# Patient Record
Sex: FEMALE | Race: WHITE | URBAN - METROPOLITAN AREA
[De-identification: names, ages, dates, MRNs, and addresses within clinical notes are randomized per-mention and may not be internally consistent; named-entity substitution may affect disease eponyms.]

---

## 2017-01-31 DIAGNOSIS — F32.89 DEPRESSIVE DISORDER, NOT ELSEWHERE CLASSIFIED: Primary | ICD-10-CM

## 2017-01-31 RX ORDER — CITALOPRAM 40 MG/1
40 TABLET ORAL DAILY
Qty: 90 TABLET | Refills: 1 | Status: SHIPPED | OUTPATIENT
Start: 2017-01-31 | End: 2017-07-28 | Stop reason: SDUPTHER

## 2017-02-02 ENCOUNTER — OFFICE VISIT (OUTPATIENT)
Dept: FAMILY MEDICINE CLINIC | Age: 62
End: 2017-02-02

## 2017-02-02 VITALS
HEART RATE: 85 BPM | WEIGHT: 160.4 LBS | BODY MASS INDEX: 25.78 KG/M2 | SYSTOLIC BLOOD PRESSURE: 110 MMHG | DIASTOLIC BLOOD PRESSURE: 62 MMHG | HEIGHT: 66 IN | OXYGEN SATURATION: 95 % | TEMPERATURE: 98.2 F

## 2017-02-02 DIAGNOSIS — R07.2 PRECORDIAL PAIN: Primary | ICD-10-CM

## 2017-02-02 DIAGNOSIS — K21.9 GASTROESOPHAGEAL REFLUX DISEASE, ESOPHAGITIS PRESENCE NOT SPECIFIED: ICD-10-CM

## 2017-02-02 PROCEDURE — 99213 OFFICE O/P EST LOW 20 MIN: CPT | Performed by: FAMILY MEDICINE

## 2017-02-02 PROCEDURE — 93000 ELECTROCARDIOGRAM COMPLETE: CPT | Performed by: FAMILY MEDICINE

## 2017-02-02 RX ORDER — OMEPRAZOLE 40 MG/1
40 CAPSULE, DELAYED RELEASE ORAL DAILY
Qty: 30 CAPSULE | Refills: 3 | Status: SHIPPED | OUTPATIENT
Start: 2017-02-02 | End: 2017-04-21

## 2017-02-02 ASSESSMENT — ENCOUNTER SYMPTOMS
RESPIRATORY NEGATIVE: 1
EYES NEGATIVE: 1
GASTROINTESTINAL NEGATIVE: 1

## 2017-03-10 ENCOUNTER — OFFICE VISIT (OUTPATIENT)
Dept: ORTHOPEDIC SURGERY | Age: 62
End: 2017-03-10

## 2017-03-10 VITALS
BODY MASS INDEX: 25.79 KG/M2 | HEART RATE: 76 BPM | SYSTOLIC BLOOD PRESSURE: 106 MMHG | WEIGHT: 160.5 LBS | HEIGHT: 66 IN | DIASTOLIC BLOOD PRESSURE: 61 MMHG

## 2017-03-10 DIAGNOSIS — S83.241A TEAR OF MEDIAL MENISCUS OF RIGHT KNEE, CURRENT, UNSPECIFIED TEAR TYPE, INITIAL ENCOUNTER: ICD-10-CM

## 2017-03-10 DIAGNOSIS — M89.9 BONE LESION: ICD-10-CM

## 2017-03-10 DIAGNOSIS — M25.562 PAIN IN BOTH KNEES, UNSPECIFIED CHRONICITY: Primary | ICD-10-CM

## 2017-03-10 DIAGNOSIS — M84.361A STRESS FRACTURE OF RIGHT TIBIA, INITIAL ENCOUNTER: ICD-10-CM

## 2017-03-10 DIAGNOSIS — M25.561 PAIN IN BOTH KNEES, UNSPECIFIED CHRONICITY: Primary | ICD-10-CM

## 2017-03-10 PROCEDURE — 99213 OFFICE O/P EST LOW 20 MIN: CPT | Performed by: PHYSICIAN ASSISTANT

## 2017-03-10 PROCEDURE — 73564 X-RAY EXAM KNEE 4 OR MORE: CPT | Performed by: PHYSICIAN ASSISTANT

## 2017-03-23 ENCOUNTER — OFFICE VISIT (OUTPATIENT)
Dept: ORTHOPEDIC SURGERY | Age: 62
End: 2017-03-23

## 2017-03-23 DIAGNOSIS — M23.306 DEGENERATIVE TEAR OF MENISCUS, RIGHT: ICD-10-CM

## 2017-03-23 DIAGNOSIS — M17.0 PRIMARY OSTEOARTHRITIS OF BOTH KNEES: Primary | ICD-10-CM

## 2017-03-23 PROBLEM — M23.309 DEGENERATIVE TEAR OF MENISCUS: Status: ACTIVE | Noted: 2017-03-23

## 2017-03-23 PROCEDURE — 20611 DRAIN/INJ JOINT/BURSA W/US: CPT | Performed by: PHYSICIAN ASSISTANT

## 2017-03-23 PROCEDURE — 99213 OFFICE O/P EST LOW 20 MIN: CPT | Performed by: PHYSICIAN ASSISTANT

## 2017-04-07 ENCOUNTER — OFFICE VISIT (OUTPATIENT)
Dept: ORTHOPEDIC SURGERY | Age: 62
End: 2017-04-07

## 2017-04-07 VITALS
WEIGHT: 160.5 LBS | DIASTOLIC BLOOD PRESSURE: 44 MMHG | SYSTOLIC BLOOD PRESSURE: 114 MMHG | BODY MASS INDEX: 25.79 KG/M2 | HEART RATE: 83 BPM | HEIGHT: 66 IN

## 2017-04-07 DIAGNOSIS — M17.11 PRIMARY OSTEOARTHRITIS OF RIGHT KNEE: ICD-10-CM

## 2017-04-07 DIAGNOSIS — S83.241D OTHER TEAR OF MEDIAL MENISCUS OF RIGHT KNEE AS CURRENT INJURY, SUBSEQUENT ENCOUNTER: Primary | ICD-10-CM

## 2017-04-07 PROCEDURE — 99213 OFFICE O/P EST LOW 20 MIN: CPT | Performed by: PHYSICIAN ASSISTANT

## 2017-04-07 RX ORDER — TRAMADOL HYDROCHLORIDE 50 MG/1
TABLET ORAL
Qty: 30 TABLET | Refills: 0 | Status: SHIPPED | OUTPATIENT
Start: 2017-04-07

## 2017-04-18 ENCOUNTER — TELEPHONE (OUTPATIENT)
Dept: ORTHOPEDIC SURGERY | Age: 62
End: 2017-04-18

## 2017-04-21 ENCOUNTER — TELEPHONE (OUTPATIENT)
Dept: FAMILY MEDICINE CLINIC | Age: 62
End: 2017-04-21

## 2017-04-27 ENCOUNTER — OFFICE VISIT (OUTPATIENT)
Dept: FAMILY MEDICINE CLINIC | Age: 62
End: 2017-04-27

## 2017-04-27 ENCOUNTER — OFFICE VISIT (OUTPATIENT)
Dept: ORTHOPEDIC SURGERY | Age: 62
End: 2017-04-27

## 2017-04-27 VITALS
SYSTOLIC BLOOD PRESSURE: 112 MMHG | HEIGHT: 66 IN | BODY MASS INDEX: 24.91 KG/M2 | WEIGHT: 154.98 LBS | HEART RATE: 81 BPM | DIASTOLIC BLOOD PRESSURE: 57 MMHG

## 2017-04-27 VITALS
WEIGHT: 153.4 LBS | BODY MASS INDEX: 24.65 KG/M2 | SYSTOLIC BLOOD PRESSURE: 110 MMHG | HEIGHT: 66 IN | HEART RATE: 98 BPM | OXYGEN SATURATION: 100 % | DIASTOLIC BLOOD PRESSURE: 70 MMHG | TEMPERATURE: 98 F

## 2017-04-27 DIAGNOSIS — S83.241A OTHER TEAR OF MEDIAL MENISCUS OF RIGHT KNEE AS CURRENT INJURY, INITIAL ENCOUNTER: ICD-10-CM

## 2017-04-27 DIAGNOSIS — M17.0 PRIMARY OSTEOARTHRITIS OF BOTH KNEES: ICD-10-CM

## 2017-04-27 DIAGNOSIS — F41.1 ANXIETY STATE: ICD-10-CM

## 2017-04-27 DIAGNOSIS — F32.0 MILD SINGLE CURRENT EPISODE OF MAJOR DEPRESSIVE DISORDER (HCC): ICD-10-CM

## 2017-04-27 DIAGNOSIS — Z01.818 PRE-OPERATIVE GENERAL PHYSICAL EXAMINATION: ICD-10-CM

## 2017-04-27 DIAGNOSIS — M23.306 DEGENERATIVE TEAR OF MENISCUS, RIGHT: ICD-10-CM

## 2017-04-27 DIAGNOSIS — M17.11 PRIMARY OSTEOARTHRITIS OF RIGHT KNEE: ICD-10-CM

## 2017-04-27 DIAGNOSIS — S83.241D OTHER TEAR OF MEDIAL MENISCUS OF RIGHT KNEE AS CURRENT INJURY, SUBSEQUENT ENCOUNTER: Primary | ICD-10-CM

## 2017-04-27 DIAGNOSIS — E03.9 ACQUIRED HYPOTHYROIDISM: ICD-10-CM

## 2017-04-27 DIAGNOSIS — Z01.818 PRE-OPERATIVE GENERAL PHYSICAL EXAMINATION: Primary | ICD-10-CM

## 2017-04-27 DIAGNOSIS — K21.9 GASTROESOPHAGEAL REFLUX DISEASE, ESOPHAGITIS PRESENCE NOT SPECIFIED: ICD-10-CM

## 2017-04-27 PROBLEM — M25.561 PAIN IN BOTH KNEES: Status: RESOLVED | Noted: 2017-03-10 | Resolved: 2017-04-27

## 2017-04-27 PROBLEM — M25.562 PAIN IN BOTH KNEES: Status: RESOLVED | Noted: 2017-03-10 | Resolved: 2017-04-27

## 2017-04-27 LAB
ALBUMIN SERPL-MCNC: 4.4 G/DL (ref 3.4–5)
ANION GAP SERPL CALCULATED.3IONS-SCNC: 13 MMOL/L (ref 3–16)
APTT: 26.5 SEC (ref 21–31.8)
BUN BLDV-MCNC: 15 MG/DL (ref 7–20)
CALCIUM SERPL-MCNC: 9.5 MG/DL (ref 8.3–10.6)
CHLORIDE BLD-SCNC: 99 MMOL/L (ref 99–110)
CO2: 29 MMOL/L (ref 21–32)
CREAT SERPL-MCNC: <0.5 MG/DL (ref 0.6–1.2)
GFR AFRICAN AMERICAN: >60
GFR NON-AFRICAN AMERICAN: >60
GLUCOSE BLD-MCNC: 86 MG/DL (ref 70–99)
INR BLD: 1.04 (ref 0.85–1.15)
PHOSPHORUS: 4.4 MG/DL (ref 2.5–4.9)
POTASSIUM SERPL-SCNC: 3.7 MMOL/L (ref 3.5–5.1)
PROTHROMBIN TIME: 11.7 SEC (ref 9.6–13)
SODIUM BLD-SCNC: 141 MMOL/L (ref 136–145)

## 2017-04-27 PROCEDURE — PREOPEXAM PRE-OP EXAM: Performed by: ORTHOPAEDIC SURGERY

## 2017-04-27 PROCEDURE — 99244 OFF/OP CNSLTJ NEW/EST MOD 40: CPT | Performed by: FAMILY MEDICINE

## 2017-04-28 ENCOUNTER — HOSPITAL ENCOUNTER (OUTPATIENT)
Dept: SURGERY | Age: 62
Discharge: HOME | End: 2017-04-28
Attending: ORTHOPAEDIC SURGERY | Admitting: ORTHOPAEDIC SURGERY

## 2017-04-28 VITALS
WEIGHT: 153 LBS | HEIGHT: 66 IN | TEMPERATURE: 97 F | OXYGEN SATURATION: 100 % | RESPIRATION RATE: 16 BRPM | BODY MASS INDEX: 24.59 KG/M2 | SYSTOLIC BLOOD PRESSURE: 128 MMHG | DIASTOLIC BLOOD PRESSURE: 66 MMHG | HEART RATE: 85 BPM

## 2017-04-28 DIAGNOSIS — E03.9 ACQUIRED HYPOTHYROIDISM: ICD-10-CM

## 2017-04-28 RX ORDER — SCOLOPAMINE TRANSDERMAL SYSTEM 1 MG/1
1 PATCH, EXTENDED RELEASE TRANSDERMAL ONCE
Status: DISCONTINUED | OUTPATIENT
Start: 2017-04-28 | End: 2017-04-29 | Stop reason: HOSPADM

## 2017-04-28 RX ORDER — APREPITANT 40 MG/1
CAPSULE ORAL
Status: DISPENSED
Start: 2017-04-28 | End: 2017-04-28

## 2017-04-28 RX ORDER — LIDOCAINE HYDROCHLORIDE 10 MG/ML
1 INJECTION, SOLUTION EPIDURAL; INFILTRATION; INTRACAUDAL; PERINEURAL
Status: ACTIVE | OUTPATIENT
Start: 2017-04-28 | End: 2017-04-28

## 2017-04-28 RX ORDER — DIPHENHYDRAMINE HYDROCHLORIDE 50 MG/ML
12.5 INJECTION INTRAMUSCULAR; INTRAVENOUS
Status: ACTIVE | OUTPATIENT
Start: 2017-04-28 | End: 2017-04-28

## 2017-04-28 RX ORDER — MEPERIDINE HYDROCHLORIDE 50 MG/ML
12.5 INJECTION INTRAMUSCULAR; INTRAVENOUS; SUBCUTANEOUS EVERY 5 MIN PRN
Status: DISCONTINUED | OUTPATIENT
Start: 2017-04-28 | End: 2017-04-29 | Stop reason: HOSPADM

## 2017-04-28 RX ORDER — TRAMADOL HYDROCHLORIDE 50 MG/1
50 TABLET ORAL EVERY 4 HOURS PRN
Qty: 60 TABLET | Refills: 0 | Status: SHIPPED | OUTPATIENT
Start: 2017-04-28 | End: 2020-11-02 | Stop reason: SDUPTHER

## 2017-04-28 RX ORDER — SODIUM CHLORIDE, SODIUM LACTATE, POTASSIUM CHLORIDE, CALCIUM CHLORIDE 600; 310; 30; 20 MG/100ML; MG/100ML; MG/100ML; MG/100ML
INJECTION, SOLUTION INTRAVENOUS CONTINUOUS
Status: DISCONTINUED | OUTPATIENT
Start: 2017-04-28 | End: 2017-04-29 | Stop reason: HOSPADM

## 2017-04-28 RX ORDER — OXYCODONE HYDROCHLORIDE AND ACETAMINOPHEN 5; 325 MG/1; MG/1
2 TABLET ORAL PRN
Status: ACTIVE | OUTPATIENT
Start: 2017-04-28 | End: 2017-04-28

## 2017-04-28 RX ORDER — LEVOTHYROXINE SODIUM 112 UG/1
112 TABLET ORAL DAILY
Qty: 90 TABLET | Refills: 1 | Status: SHIPPED | OUTPATIENT
Start: 2017-04-28 | End: 2017-10-28 | Stop reason: SDUPTHER

## 2017-04-28 RX ORDER — MECLIZINE HYDROCHLORIDE 25 MG/1
25 TABLET ORAL 3 TIMES DAILY PRN
COMMUNITY

## 2017-04-28 RX ORDER — APREPITANT 40 MG/1
40 CAPSULE ORAL ONCE
Status: DISCONTINUED | OUTPATIENT
Start: 2017-04-28 | End: 2017-04-29 | Stop reason: HOSPADM

## 2017-04-28 RX ORDER — HYDRALAZINE HYDROCHLORIDE 20 MG/ML
5 INJECTION INTRAMUSCULAR; INTRAVENOUS
Status: DISCONTINUED | OUTPATIENT
Start: 2017-04-28 | End: 2017-04-29 | Stop reason: HOSPADM

## 2017-04-28 RX ORDER — SCOLOPAMINE TRANSDERMAL SYSTEM 1 MG/1
PATCH, EXTENDED RELEASE TRANSDERMAL
Status: DISPENSED
Start: 2017-04-28 | End: 2017-04-28

## 2017-04-28 RX ORDER — OXYCODONE HYDROCHLORIDE AND ACETAMINOPHEN 5; 325 MG/1; MG/1
1 TABLET ORAL PRN
Status: ACTIVE | OUTPATIENT
Start: 2017-04-28 | End: 2017-04-28

## 2017-04-28 RX ORDER — ONDANSETRON 2 MG/ML
4 INJECTION INTRAMUSCULAR; INTRAVENOUS
Status: ACTIVE | OUTPATIENT
Start: 2017-04-28 | End: 2017-04-28

## 2017-04-28 RX ORDER — LABETALOL HYDROCHLORIDE 5 MG/ML
5 INJECTION, SOLUTION INTRAVENOUS EVERY 10 MIN PRN
Status: DISCONTINUED | OUTPATIENT
Start: 2017-04-28 | End: 2017-04-29 | Stop reason: HOSPADM

## 2017-04-28 RX ADMIN — MEPERIDINE HYDROCHLORIDE 12.5 MG: 50 INJECTION INTRAMUSCULAR; INTRAVENOUS; SUBCUTANEOUS at 11:56

## 2017-04-28 RX ADMIN — SODIUM CHLORIDE, SODIUM LACTATE, POTASSIUM CHLORIDE, CALCIUM CHLORIDE: 600; 310; 30; 20 INJECTION, SOLUTION INTRAVENOUS at 08:40

## 2017-04-28 ASSESSMENT — PAIN DESCRIPTION - DESCRIPTORS: DESCRIPTORS: DISCOMFORT

## 2017-04-28 ASSESSMENT — PAIN SCALES - GENERAL
PAINLEVEL_OUTOF10: 6
PAINLEVEL_OUTOF10: 3
PAINLEVEL_OUTOF10: 6

## 2017-04-28 ASSESSMENT — PAIN - FUNCTIONAL ASSESSMENT: PAIN_FUNCTIONAL_ASSESSMENT: 0-10

## 2017-05-01 ENCOUNTER — TELEPHONE (OUTPATIENT)
Dept: ORTHOPEDIC SURGERY | Age: 62
End: 2017-05-01

## 2017-05-04 ENCOUNTER — OFFICE VISIT (OUTPATIENT)
Dept: ORTHOPEDIC SURGERY | Age: 62
End: 2017-05-04

## 2017-05-04 DIAGNOSIS — M94.261 CHONDROMALACIA OF KNEE, RIGHT: ICD-10-CM

## 2017-05-04 DIAGNOSIS — M67.51 PLICA SYNDROME OF KNEE, RIGHT: ICD-10-CM

## 2017-05-04 DIAGNOSIS — S83.281A TEAR OF LATERAL MENISCUS OF RIGHT KNEE, UNSPECIFIED TEAR TYPE, UNSPECIFIED WHETHER OLD OR CURRENT TEAR, INITIAL ENCOUNTER: Primary | ICD-10-CM

## 2017-05-04 DIAGNOSIS — S83.241D OTHER TEAR OF MEDIAL MENISCUS OF RIGHT KNEE AS CURRENT INJURY, SUBSEQUENT ENCOUNTER: ICD-10-CM

## 2017-05-04 PROCEDURE — 99024 POSTOP FOLLOW-UP VISIT: CPT | Performed by: ORTHOPAEDIC SURGERY

## 2017-05-15 ENCOUNTER — HOSPITAL ENCOUNTER (OUTPATIENT)
Dept: PHYSICAL THERAPY | Age: 62
Discharge: HOME | End: 2017-05-31
Admitting: ORTHOPAEDIC SURGERY

## 2017-05-22 ENCOUNTER — HOSPITAL ENCOUNTER (OUTPATIENT)
Dept: PHYSICAL THERAPY | Age: 62
Discharge: HOME | End: 2017-05-22
Admitting: ORTHOPAEDIC SURGERY

## 2017-06-01 ENCOUNTER — HOSPITAL ENCOUNTER (OUTPATIENT)
Dept: PHYSICAL THERAPY | Age: 62
Discharge: HOME | End: 2017-06-01
Admitting: ORTHOPAEDIC SURGERY

## 2017-06-06 ENCOUNTER — HOSPITAL ENCOUNTER (OUTPATIENT)
Dept: PHYSICAL THERAPY | Age: 62
Discharge: HOME | End: 2017-06-06
Admitting: ORTHOPAEDIC SURGERY

## 2017-07-24 ENCOUNTER — OFFICE VISIT (OUTPATIENT)
Dept: ORTHOPEDIC SURGERY | Age: 62
End: 2017-07-24

## 2017-07-24 DIAGNOSIS — M17.0 PRIMARY OSTEOARTHRITIS OF BOTH KNEES: Primary | ICD-10-CM

## 2017-07-24 PROBLEM — M17.11 PRIMARY OSTEOARTHRITIS OF RIGHT KNEE: Status: ACTIVE | Noted: 2017-07-24

## 2017-07-24 PROCEDURE — 99024 POSTOP FOLLOW-UP VISIT: CPT | Performed by: ORTHOPAEDIC SURGERY

## 2017-07-27 ENCOUNTER — TELEPHONE (OUTPATIENT)
Dept: ORTHOPEDIC SURGERY | Age: 62
End: 2017-07-27

## 2017-07-28 ENCOUNTER — TELEPHONE (OUTPATIENT)
Dept: ORTHOPEDIC SURGERY | Age: 62
End: 2017-07-28

## 2017-07-28 DIAGNOSIS — F32.89 DEPRESSIVE DISORDER, NOT ELSEWHERE CLASSIFIED: ICD-10-CM

## 2017-07-28 RX ORDER — CITALOPRAM 40 MG/1
40 TABLET ORAL DAILY
Qty: 90 TABLET | Refills: 0 | Status: SHIPPED | OUTPATIENT
Start: 2017-07-28 | End: 2017-10-23 | Stop reason: SDUPTHER

## 2017-08-16 ENCOUNTER — NURSE ONLY (OUTPATIENT)
Dept: ORTHOPEDIC SURGERY | Age: 62
End: 2017-08-16

## 2017-08-16 DIAGNOSIS — M17.0 PRIMARY OSTEOARTHRITIS OF BOTH KNEES: Primary | ICD-10-CM

## 2017-08-16 PROCEDURE — 20611 DRAIN/INJ JOINT/BURSA W/US: CPT | Performed by: PHYSICIAN ASSISTANT

## 2017-08-17 ENCOUNTER — OFFICE VISIT (OUTPATIENT)
Dept: FAMILY MEDICINE CLINIC | Age: 62
End: 2017-08-17

## 2017-08-17 VITALS
TEMPERATURE: 98.4 F | HEIGHT: 66 IN | DIASTOLIC BLOOD PRESSURE: 66 MMHG | BODY MASS INDEX: 25.3 KG/M2 | WEIGHT: 157.4 LBS | SYSTOLIC BLOOD PRESSURE: 114 MMHG

## 2017-08-17 DIAGNOSIS — M54.31 SCIATICA, RIGHT SIDE: Primary | ICD-10-CM

## 2017-08-17 PROCEDURE — 99213 OFFICE O/P EST LOW 20 MIN: CPT | Performed by: FAMILY MEDICINE

## 2017-08-17 RX ORDER — METHYLPREDNISOLONE 4 MG/1
4 TABLET ORAL SEE ADMIN INSTRUCTIONS
Qty: 1 KIT | Refills: 0 | Status: SHIPPED | OUTPATIENT
Start: 2017-08-17 | End: 2017-08-23

## 2017-08-20 ASSESSMENT — ENCOUNTER SYMPTOMS
RESPIRATORY NEGATIVE: 1
EYES NEGATIVE: 1
GASTROINTESTINAL NEGATIVE: 1

## 2017-08-23 ENCOUNTER — NURSE ONLY (OUTPATIENT)
Dept: ORTHOPEDIC SURGERY | Age: 62
End: 2017-08-23

## 2017-08-23 DIAGNOSIS — M17.0 PRIMARY OSTEOARTHRITIS OF BOTH KNEES: Primary | ICD-10-CM

## 2017-08-23 PROCEDURE — 20611 DRAIN/INJ JOINT/BURSA W/US: CPT | Performed by: PHYSICIAN ASSISTANT

## 2017-08-30 ENCOUNTER — NURSE ONLY (OUTPATIENT)
Dept: ORTHOPEDIC SURGERY | Age: 62
End: 2017-08-30

## 2017-08-30 DIAGNOSIS — M17.0 PRIMARY OSTEOARTHRITIS OF BOTH KNEES: Primary | ICD-10-CM

## 2017-08-30 PROCEDURE — 20611 DRAIN/INJ JOINT/BURSA W/US: CPT | Performed by: PHYSICIAN ASSISTANT

## 2017-10-23 RX ORDER — CITALOPRAM 40 MG/1
40 TABLET ORAL DAILY
Qty: 90 TABLET | Refills: 1 | Status: SHIPPED | OUTPATIENT
Start: 2017-10-23 | End: 2018-04-26 | Stop reason: SDUPTHER

## 2017-10-28 DIAGNOSIS — E03.9 ACQUIRED HYPOTHYROIDISM: ICD-10-CM

## 2017-10-28 RX ORDER — LEVOTHYROXINE SODIUM 112 UG/1
112 TABLET ORAL DAILY
Qty: 90 TABLET | Refills: 1 | Status: SHIPPED | OUTPATIENT
Start: 2017-10-28 | End: 2018-04-26 | Stop reason: SDUPTHER

## 2017-11-20 ENCOUNTER — TELEPHONE (OUTPATIENT)
Dept: FAMILY MEDICINE CLINIC | Age: 62
End: 2017-11-20

## 2017-11-27 ENCOUNTER — OFFICE VISIT (OUTPATIENT)
Dept: FAMILY MEDICINE CLINIC | Age: 62
End: 2017-11-27

## 2017-11-27 VITALS
BODY MASS INDEX: 26.12 KG/M2 | SYSTOLIC BLOOD PRESSURE: 112 MMHG | WEIGHT: 161.8 LBS | DIASTOLIC BLOOD PRESSURE: 76 MMHG | TEMPERATURE: 98.4 F

## 2017-11-27 DIAGNOSIS — J06.9 VIRAL URI: Primary | ICD-10-CM

## 2017-11-27 DIAGNOSIS — N30.01 ACUTE CYSTITIS WITH HEMATURIA: ICD-10-CM

## 2017-11-27 DIAGNOSIS — S76.211A GROIN STRAIN, RIGHT, INITIAL ENCOUNTER: ICD-10-CM

## 2017-11-27 DIAGNOSIS — B37.31 YEAST VAGINITIS: ICD-10-CM

## 2017-11-27 LAB
BILIRUBIN, POC: NORMAL
BLOOD URINE, POC: NORMAL
CLARITY, POC: CLEAR
COLOR, POC: YELLOW
GLUCOSE URINE, POC: NORMAL
KETONES, POC: NORMAL
LEUKOCYTE EST, POC: NORMAL
NITRITE, POC: NORMAL
PH, POC: 5
PROTEIN, POC: NORMAL
SPECIFIC GRAVITY, POC: 1.01
UROBILINOGEN, POC: 0.2

## 2017-11-27 PROCEDURE — 81002 URINALYSIS NONAUTO W/O SCOPE: CPT | Performed by: FAMILY MEDICINE

## 2017-11-27 PROCEDURE — 99214 OFFICE O/P EST MOD 30 MIN: CPT | Performed by: FAMILY MEDICINE

## 2017-11-27 RX ORDER — FLUCONAZOLE 150 MG/1
150 TABLET ORAL ONCE
Qty: 2 TABLET | Refills: 0 | Status: SHIPPED | OUTPATIENT
Start: 2017-11-27 | End: 2017-11-27

## 2017-11-28 ENCOUNTER — TELEPHONE (OUTPATIENT)
Dept: FAMILY MEDICINE CLINIC | Age: 62
End: 2017-11-28

## 2017-11-29 ASSESSMENT — ENCOUNTER SYMPTOMS
GASTROINTESTINAL NEGATIVE: 1
RESPIRATORY NEGATIVE: 1
EYES NEGATIVE: 1

## 2017-11-29 NOTE — TELEPHONE ENCOUNTER
Pt reporting she is much better this AM.  Pain is still there, however not nearly as excruciating. She will continue the medication and try to stretch today. She will let us know if things do not improve. Thanks!

## 2017-11-29 NOTE — PROGRESS NOTES
Mother     Stroke Mother     High Blood Pressure Sister     Arthritis Maternal Grandmother     Diabetes Maternal Grandmother     Cancer Paternal Grandmother     Cancer Sister      face     Social History     Social History    Marital status:      Spouse name: N/A    Number of children: N/A    Years of education: N/A     Occupational History    Not on file. Social History Main Topics    Smoking status: Never Smoker    Smokeless tobacco: Never Used    Alcohol use Yes      Comment: 0-1 drinks per month    Drug use: No    Sexual activity: Not on file     Other Topics Concern    Not on file     Social History Narrative    No narrative on file         Any recent diagnostic tests, hospital reports, office notes or consultation letters were reviewed prior to and during the visit. Review of Systems   Constitutional: Negative. HENT: Negative. Eyes: Negative. Respiratory: Negative. Cardiovascular: Negative. Gastrointestinal: Negative. Genitourinary: Negative. Musculoskeletal: Negative. Psychiatric/Behavioral: Negative. Physical Exam   Constitutional: She appears well-developed and well-nourished. She appears distressed. HENT:   Head: Normocephalic and atraumatic. Right Ear: Hearing, tympanic membrane, external ear and ear canal normal.   Left Ear: Hearing, tympanic membrane, external ear and ear canal normal.   Nose: Mucosal edema and rhinorrhea present. Mouth/Throat: Uvula is midline, oropharynx is clear and moist and mucous membranes are normal.   Eyes: Conjunctivae and EOM are normal. Pupils are equal, round, and reactive to light. Right eye exhibits no discharge. Left eye exhibits no discharge. No scleral icterus. Neck: Trachea normal and normal range of motion. Neck supple. Normal carotid pulses, no hepatojugular reflux and no JVD present. Carotid bruit is not present. No tracheal deviation present. No thyromegaly present.    Cardiovascular: Normal rate, regular rhythm, S2 normal, normal heart sounds and intact distal pulses. PMI is not displaced. Exam reveals no gallop and no friction rub. No murmur heard. Pulses:       Carotid pulses are 2+ on the right side, and 2+ on the left side. Dorsalis pedis pulses are 2+ on the right side, and 2+ on the left side. Posterior tibial pulses are 2+ on the right side, and 2+ on the left side. Pulmonary/Chest: Effort normal and breath sounds normal. No stridor. No respiratory distress. She has no decreased breath sounds. She has no wheezes. She has no rhonchi. She has no rales. She exhibits no tenderness. Abdominal: Soft. Bowel sounds are normal. She exhibits no distension and no mass. There is no hepatosplenomegaly. There is no tenderness. There is no rebound and no guarding. No hernia. Musculoskeletal:        Right hip: She exhibits decreased range of motion, decreased strength and tenderness. Right ankle: She exhibits no swelling. Left ankle: She exhibits no swelling. Legs:  Lymphadenopathy:     She has no cervical adenopathy. Skin: She is not diaphoretic. Psychiatric: She has a normal mood and affect. Her behavior is normal. Judgment and thought content normal.         1. Viral URI  The condition is deteriorating, will change treatment, investigate cause and make further recommendations when data back. otc therapy     2. Yeast vaginitis  The condition is deteriorating, will change treatment, investigate cause and make further recommendations when data back. Will treat     3. Acute cystitis with hematuria   The condition is deteriorating, will change treatment, investigate cause and make further recommendations when data back. Will treat  POCT Urinalysis no Micro   4. Groin strain, right, initial encounter   The condition is deteriorating, will change treatment, investigate cause and make further recommendations when data back.  Need ot add ice and nsaid

## 2018-01-29 ENCOUNTER — OFFICE VISIT (OUTPATIENT)
Dept: FAMILY MEDICINE CLINIC | Age: 63
End: 2018-01-29

## 2018-01-29 VITALS
TEMPERATURE: 98.6 F | BODY MASS INDEX: 26.73 KG/M2 | SYSTOLIC BLOOD PRESSURE: 122 MMHG | DIASTOLIC BLOOD PRESSURE: 72 MMHG | WEIGHT: 165.6 LBS

## 2018-01-29 DIAGNOSIS — Z11.4 SCREENING FOR HIV (HUMAN IMMUNODEFICIENCY VIRUS): ICD-10-CM

## 2018-01-29 DIAGNOSIS — L84 CORN OR CALLUS: Primary | ICD-10-CM

## 2018-01-29 DIAGNOSIS — L60.0 INGROWING NAIL, RIGHT GREAT TOE: ICD-10-CM

## 2018-01-29 DIAGNOSIS — F32.0 MILD SINGLE CURRENT EPISODE OF MAJOR DEPRESSIVE DISORDER (HCC): ICD-10-CM

## 2018-01-29 DIAGNOSIS — E55.9 VITAMIN D DEFICIENCY: ICD-10-CM

## 2018-01-29 DIAGNOSIS — Z11.59 ENCOUNTER FOR HEPATITIS C SCREENING TEST FOR LOW RISK PATIENT: ICD-10-CM

## 2018-01-29 DIAGNOSIS — E03.9 ACQUIRED HYPOTHYROIDISM: ICD-10-CM

## 2018-01-29 PROCEDURE — 99214 OFFICE O/P EST MOD 30 MIN: CPT | Performed by: FAMILY MEDICINE

## 2018-01-29 ASSESSMENT — ENCOUNTER SYMPTOMS
GASTROINTESTINAL NEGATIVE: 1
RESPIRATORY NEGATIVE: 1
EYES NEGATIVE: 1

## 2018-01-29 NOTE — PROGRESS NOTES
1/29/18    Lon Kirkland  1955      Chief Complaint   Patient presents with    Ingrown Toenail     ingrown toe nail on right foot, painful, difficult to wear shoes     Pt has had increased pain and swellign in the right and left great toe off and on for months. No treatment    Depression: Patient complains of depression. She complains of anhedonia, depressed mood, difficulty concentrating and feelings of worthlessness/guilt. Onset was approximately several months ago, gradually worsening since that time. She denies current suicidal and homicidal plan or intent. Family history significant for no psychiatric illness. Possible organic causes contributing are: none. Risk factors: negative life event daughter living with her and previous episode of depression Previous treatment includes Celexa and individual therapy. She complains of the following side effects from the treatment: none. Hypothyroidism: Patient presents for evaluation of thyroid function. Symptoms consist of fatigue. Symptoms have present for several years. The symptoms are mild. The problem has been controlled. Previous thyroid studies include TSH. The hypothyroidism is due to hypothyroidism. Vitamin D Deficiency  Patient presents for evaluation of Vitamin D Deficiency. Bone Gap [employer] exposure is currently 0 hours per weeki. Drinks approximately 1 cups of Vitamin D milk every week. Fish or fish oil is ingested 0 per week. Denies diffuse bone pain or tenderness, muscle weakness. Currently does not have celiac disease, Crohn's disease, cystic fibrosis, kidney or liver disease processes. Does currently take a multivitamin with Vitamin D included.     Vitals:    01/29/18 1031   BP: 122/72   Temp: 98.6 °F (37 °C)         Immunization History   Administered Date(s) Administered    Hepatitis A 07/08/2010    Influenza Virus Vaccine 10/16/2015, 10/19/2017    Influenza, Quadv, 3 yrs and older, IM, Preservative Free 10/03/2016    Tdap (Boostrix, Adacel) 07/08/2010    Yellow Fever 07/08/2010    Zoster 06/16/2014       Allergies   Allergen Reactions    Biaxin [Clarithromycin]     Codeine     Naprosyn [Naproxen]     Naproxen Sodium     Other Swelling     Surgical glue    Morphine Nausea And Vomiting     Outpatient Prescriptions Marked as Taking for the 1/29/18 encounter (Office Visit) with Sheyla Basurto MD   Medication Sig Dispense Refill    levothyroxine (SYNTHROID) 112 MCG tablet Take 1 tablet by mouth daily 90 tablet 1    citalopram (CELEXA) 40 MG tablet Take 1 tablet by mouth daily 90 tablet 1    meclizine (ANTIVERT) 25 MG tablet Take 25 mg by mouth 3 times daily as needed      Sennosides-Docusate Sodium (SENOKOT S PO) Take by mouth daily      ondansetron (ZOFRAN) 4 MG tablet Take 1 tablet by mouth every 8 hours as needed for Nausea 20 tablet 0    traMADol (ULTRAM) 50 MG tablet ONE EVERY 6 HRS PRN PAIN 30 tablet 0    acetaminophen (TYLENOL) 325 MG tablet Take 650 mg by mouth every 6 hours as needed for Pain      triamterene-hydrochlorothiazide (DYAZIDE) 37.5-25 MG per capsule Take 1 capsule by mouth daily.          Past Medical History:   Diagnosis Date    Breast cancer screening 06/07/2017    [facility]/no evidence of malignancy    Cancer ([facility] [address]     squamous cell skin    Chicken pox     Depression     Hypothyroidism     partial thyroidectomy    Meniere's disease     Prolonged emergence from general anesthesia      Past Surgical History:   Procedure Laterality Date    BARTHOLIN GLAND CYST EXCISION      CARPAL TUNNEL RELEASE Bilateral     DILATION AND CURETTAGE OF UTERUS      FOOT SURGERY Right     halux rig    HYSTERECTOMY      KNEE ARTHROSCOPY Bilateral     LAPAROSCOPY      MASTOID SURGERY Right     shunt for Meniere's disease    MISAEL AND BSO      THYROIDECTOMY      partial    WISDOM TOOTH EXTRACTION       Family History   Problem Relation Age of Onset    High Blood Pressure Mother     Stroke Mother     High Blood Pressure Sister     Arthritis Maternal Grandmother     Diabetes Maternal Grandmother     Cancer Paternal Grandmother     Cancer Sister      face     Social History     Social History    Marital status:      Spouse name: N/A    Number of children: N/A    Years of education: N/A     Occupational History    Not on file. Social History Main Topics    Smoking status: Never Smoker    Smokeless tobacco: Never Used    Alcohol use Yes      Comment: 0-1 drinks per month    Drug use: No    Sexual activity: Not on file     Other Topics Concern    Not on file     Social History Narrative    No narrative on file         Any recent diagnostic tests, hospital reports, office notes or consultation letters were reviewed prior to and during the visit. Review of Systems   Constitutional: Negative. HENT: Negative. Eyes: Negative. Respiratory: Negative. Cardiovascular: Negative. Gastrointestinal: Negative. Genitourinary: Negative. Musculoskeletal: Negative. Psychiatric/Behavioral: Negative. Physical Exam   Constitutional: She appears well-developed and well-nourished. No distress. Neck: Normal range of motion. Neck supple. Normal carotid pulses, no hepatojugular reflux and no JVD present. Carotid bruit is not present. No tracheal deviation present. No thyromegaly present. Cardiovascular: Normal rate, regular rhythm, S2 normal, normal heart sounds and intact distal pulses. PMI is not displaced. Exam reveals no gallop and no friction rub. No murmur heard. Pulses:       Carotid pulses are 2+ on the right side, and 2+ on the left side. Dorsalis pedis pulses are 2+ on the right side, and 2+ on the left side. Posterior tibial pulses are 2+ on the right side, and 2+ on the left side. Pulmonary/Chest: Effort normal and breath sounds normal. No stridor. No respiratory distress. She has no wheezes. She has no rales. She exhibits no tenderness. Abdominal: Soft.  Bowel

## 2018-02-14 DIAGNOSIS — E55.9 VITAMIN D DEFICIENCY: ICD-10-CM

## 2018-02-14 DIAGNOSIS — Z11.59 ENCOUNTER FOR HEPATITIS C SCREENING TEST FOR LOW RISK PATIENT: ICD-10-CM

## 2018-02-14 DIAGNOSIS — Z11.4 SCREENING FOR HIV (HUMAN IMMUNODEFICIENCY VIRUS): ICD-10-CM

## 2018-02-14 DIAGNOSIS — E03.9 ACQUIRED HYPOTHYROIDISM: ICD-10-CM

## 2018-02-14 LAB
A/G RATIO: 2 (ref 1.1–2.2)
ALBUMIN SERPL-MCNC: 4.4 G/DL (ref 3.4–5)
ALP BLD-CCNC: 50 U/L (ref 40–129)
ALT SERPL-CCNC: 12 U/L (ref 10–40)
ANION GAP SERPL CALCULATED.3IONS-SCNC: 12 MMOL/L (ref 3–16)
AST SERPL-CCNC: 15 U/L (ref 15–37)
BILIRUB SERPL-MCNC: 1 MG/DL (ref 0–1)
BUN BLDV-MCNC: 11 MG/DL (ref 7–20)
CALCIUM SERPL-MCNC: 9.3 MG/DL (ref 8.3–10.6)
CHLORIDE BLD-SCNC: 99 MMOL/L (ref 99–110)
CHOLESTEROL, TOTAL: 237 MG/DL (ref 0–199)
CO2: 31 MMOL/L (ref 21–32)
CREAT SERPL-MCNC: <0.5 MG/DL (ref 0.6–1.2)
GFR AFRICAN AMERICAN: >60
GFR NON-AFRICAN AMERICAN: >60
GLOBULIN: 2.2 G/DL
GLUCOSE BLD-MCNC: 93 MG/DL (ref 70–99)
HDLC SERPL-MCNC: 64 MG/DL (ref 40–60)
LDL CHOLESTEROL CALCULATED: 158 MG/DL
POTASSIUM SERPL-SCNC: 4.1 MMOL/L (ref 3.5–5.1)
SODIUM BLD-SCNC: 142 MMOL/L (ref 136–145)
T4 FREE: 1.5 NG/DL (ref 0.9–1.8)
TOTAL PROTEIN: 6.6 G/DL (ref 6.4–8.2)
TRIGL SERPL-MCNC: 75 MG/DL (ref 0–150)
TSH SERPL DL<=0.05 MIU/L-ACNC: 0.36 UIU/ML (ref 0.27–4.2)
VITAMIN D 25-HYDROXY: 24 NG/ML
VLDLC SERPL CALC-MCNC: 15 MG/DL

## 2018-02-15 LAB
HEPATITIS C ANTIBODY INTERPRETATION: NORMAL
HIV AG/AB: NORMAL
HIV ANTIGEN: NORMAL
HIV-1 ANTIBODY: NORMAL
HIV-2 AB: NORMAL

## 2018-02-16 ENCOUNTER — PATIENT MESSAGE (OUTPATIENT)
Dept: FAMILY MEDICINE CLINIC | Age: 63
End: 2018-02-16

## 2018-02-16 DIAGNOSIS — E78.2 MIXED HYPERLIPIDEMIA: Primary | ICD-10-CM

## 2018-02-16 RX ORDER — ATORVASTATIN CALCIUM 10 MG/1
10 TABLET, FILM COATED ORAL NIGHTLY
Qty: 90 TABLET | Refills: 1 | Status: SHIPPED | OUTPATIENT
Start: 2018-02-16 | End: 2018-06-29 | Stop reason: SDUPTHER

## 2018-02-22 ENCOUNTER — OFFICE VISIT (OUTPATIENT)
Dept: FAMILY MEDICINE CLINIC | Age: 63
End: 2018-02-22

## 2018-02-22 VITALS
SYSTOLIC BLOOD PRESSURE: 100 MMHG | TEMPERATURE: 98.2 F | OXYGEN SATURATION: 100 % | HEART RATE: 98 BPM | HEIGHT: 66 IN | DIASTOLIC BLOOD PRESSURE: 62 MMHG | WEIGHT: 162.6 LBS | BODY MASS INDEX: 26.13 KG/M2

## 2018-02-22 DIAGNOSIS — N30.01 ACUTE CYSTITIS WITH HEMATURIA: ICD-10-CM

## 2018-02-22 DIAGNOSIS — F32.0 MILD SINGLE CURRENT EPISODE OF MAJOR DEPRESSIVE DISORDER (HCC): ICD-10-CM

## 2018-02-22 DIAGNOSIS — E03.9 ACQUIRED HYPOTHYROIDISM: ICD-10-CM

## 2018-02-22 DIAGNOSIS — E78.2 MIXED HYPERLIPIDEMIA: ICD-10-CM

## 2018-02-22 DIAGNOSIS — H25.013 CORTICAL AGE-RELATED CATARACT OF BOTH EYES: ICD-10-CM

## 2018-02-22 DIAGNOSIS — F41.1 ANXIETY STATE: ICD-10-CM

## 2018-02-22 DIAGNOSIS — Z01.818 PREOP GENERAL PHYSICAL EXAM: Primary | ICD-10-CM

## 2018-02-22 LAB
BILIRUBIN, POC: ABNORMAL
BLOOD URINE, POC: ABNORMAL
CLARITY, POC: ABNORMAL
COLOR, POC: YELLOW
GLUCOSE URINE, POC: ABNORMAL
KETONES, POC: ABNORMAL
LEUKOCYTE EST, POC: ABNORMAL
NITRITE, POC: ABNORMAL
PH, POC: 7
PROTEIN, POC: ABNORMAL
SPECIFIC GRAVITY, POC: 1.01
UROBILINOGEN, POC: 0.2

## 2018-02-22 PROCEDURE — 81002 URINALYSIS NONAUTO W/O SCOPE: CPT | Performed by: FAMILY MEDICINE

## 2018-02-22 PROCEDURE — 99244 OFF/OP CNSLTJ NEW/EST MOD 40: CPT | Performed by: FAMILY MEDICINE

## 2018-02-22 RX ORDER — NITROFURANTOIN 25; 75 MG/1; MG/1
100 CAPSULE ORAL 2 TIMES DAILY
Qty: 14 CAPSULE | Refills: 0 | Status: SHIPPED | OUTPATIENT
Start: 2018-02-22 | End: 2018-03-01

## 2018-02-22 RX ORDER — PHENAZOPYRIDINE HYDROCHLORIDE 100 MG/1
100 TABLET, FILM COATED ORAL 3 TIMES DAILY PRN
Qty: 21 TABLET | Refills: 0 | Status: SHIPPED | OUTPATIENT
Start: 2018-02-22 | End: 2019-02-22

## 2018-02-22 NOTE — PROGRESS NOTES
Preoperative examination 06/10/2010    Anxiety state 06/10/2010    Anemia 06/10/2010    Meniere's disease 06/10/2010    Tear of medial cartilage or meniscus of knee, current 06/10/2010     Past Surgical History:   Procedure Laterality Date    BARTHOLIN GLAND CYST EXCISION      BUNIONECTOMY Right     CARPAL TUNNEL RELEASE Bilateral     DILATION AND CURETTAGE OF UTERUS      FOOT SURGERY Right     halux rig    HYSTERECTOMY      KNEE ARTHROSCOPY Bilateral     LAPAROSCOPY      MASTOID SURGERY Right     shunt for Meniere's disease    MISAEL AND BSO      THYROIDECTOMY      partial    WISDOM TOOTH EXTRACTION       Family History   Problem Relation Age of Onset    High Blood Pressure Mother     Stroke Mother     High Blood Pressure Sister     Arthritis Maternal Grandmother     Diabetes Maternal Grandmother     Cancer Paternal Grandmother     Cancer Sister      face     Social History     Social History    Marital status:      Spouse name: N/A    Number of children: N/A    Years of education: N/A     Social History Main Topics    Smoking status: Never Smoker    Smokeless tobacco: Never Used    Alcohol use Yes      Comment: 0-1 drinks per month    Drug use: No    Sexual activity: Not Asked     Other Topics Concern    None     Social History Narrative    None     Current Outpatient Prescriptions   Medication Sig Dispense Refill    nitrofurantoin, macrocrystal-monohydrate, (MACROBID) 100 MG capsule Take 1 capsule by mouth 2 times daily for 7 days 14 capsule 0    phenazopyridine (PYRIDIUM) 100 MG tablet Take 1 tablet by mouth 3 times daily as needed for Pain 21 tablet 0    atorvastatin (LIPITOR) 10 MG tablet Take 1 tablet by mouth nightly 90 tablet 1    levothyroxine (SYNTHROID) 112 MCG tablet Take 1 tablet by mouth daily 90 tablet 1    citalopram (CELEXA) 40 MG tablet Take 1 tablet by mouth daily 90 tablet 1    meclizine (ANTIVERT) 25 MG tablet Take 25 mg by mouth 3 times daily as needed      Sennosides-Docusate Sodium (SENOKOT S PO) Take by mouth daily      ondansetron (ZOFRAN) 4 MG tablet Take 1 tablet by mouth every 8 hours as needed for Nausea 20 tablet 0    traMADol (ULTRAM) 50 MG tablet ONE EVERY 6 HRS PRN PAIN 30 tablet 0    acetaminophen (TYLENOL) 325 MG tablet Take 650 mg by mouth every 6 hours as needed for Pain      triamterene-hydrochlorothiazide (DYAZIDE) 37.5-25 MG per capsule Take 1 capsule by mouth daily. No current facility-administered medications for this visit.       Allergies   Allergen Reactions    Biaxin [Clarithromycin]     Codeine     Naprosyn [Naproxen]     Naproxen Sodium     Other Swelling     Surgical glue    Morphine Nausea And Vomiting     Review of Systems  Constitutional: negative  Eyes: negative  Ears, nose, mouth, throat, and face: negative  Respiratory: negative  Cardiovascular: negative  Gastrointestinal: negative  Genitourinary:negative except for dysuria, frequency and hematuria  Integument/breast: negative  Hematologic/lymphatic: negative  Musculoskeletal:negative  Neurological: negative  Behavioral/Psych: negative  Endocrine: negative  Allergic/Immunologic: negative       Objective:      Physical Exam  /62   Pulse 98   Temp 98.2 °F (36.8 °C) (Oral)   Ht 5' 6\" (1.676 m)   Wt 162 lb 9.6 oz (73.8 kg)   LMP 10/20/2012   SpO2 100%   BMI 26.24 kg/m²     General Appearance:  Alert, cooperative, no distress, appears stated age   Head:  Normocephalic, without obvious abnormality, atraumatic   Eyes:  PERRL, conjunctiva/corneas clear, EOM's intact, fundi benign, both eyes   Ears:  Normal TM's and external ear canals, both ears   Nose: Nares normal, septum midline,mucosa normal, no drainage or sinus tenderness   Throat: Lips, mucosa, and tongue normal; teeth and gums normal   Neck: Supple, symmetrical, trachea midline, no adenopathy;  thyroid: not enlarged, symmetric, no tenderness/mass/nodules; no carotid bruit or JVD   Back: Symmetric, no curvature, ROM normal, no CVA tenderness   Lungs:   Clear to auscultation bilaterally, respirations unlabored       Heart:  Regular rate and rhythm, S1 and S2 normal, no murmur, rub, or gallop   Abdomen:   Soft, non-tender, bowel sounds active all four quadrants,  no masses, no organomegaly   Pelvic: Deferred   Extremities: Extremities normal, atraumatic, no cyanosis or edema   Pulses: 2+ and symmetric   Skin: Skin color, texture, turgor normal, no rashes or lesions   Lymph nodes: Cervical, supraclavicular, and axillary nodes normal   Neurologic: Normal              Assessment:      57 y.o. female with planned surgery as above. Known risk factors for perioperative complications: None    \          Plan:      1. Preoperative workup as follows none  2. Change in medication regimen before surgery: none, continue med regimen including morning of surgery, w/sip of water    1. Preop general physical exam  Okay for surgery    2. 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 It has failed conservative management and needs definitive surgical repair. 3. Acquired hypothyroidism Condition stable continue the medications, treatments, will check labs as appropriate     4. Anxiety state Condition stable continue the medications, treatments, will check labs as appropriate     5. Mixed hyperlipidemia  Condition stable continue the medications, treatments, will check labs as appropriate       The patient received counseling on the following healthy behaviors: nutrition, exercise, medication adherence and decrease in alcohol consumption    Patient given educational materials on Hyperlipidemia and Nutrition if appropriate    I have instructed the patient to complete a self tracking handout on diet and instructed them to bring it with them to the  next appointment. Discussed use, benefit, and side effects of prescribed medications. Barriers to medication compliance addressed. All patient questions answered. Pt voiced understanding. Lipid Panel    Comprehensive Metabolic Panel   6. Mild single current episode of major depressive disorder ([facility] [address] Condition stable continue the medications, treatments, will check labs as appropriate     7. Acute cystitis with hematuria   The condition is deteriorating, will change treatment, investigate cause and make further recommendations when data back.  Need to treat  nitrofurantoin, macrocrystal-monohydrate, (MACROBID) 100 MG capsule    phenazopyridine (PYRIDIUM) 100 MG tablet    Urine Culture    POCT Urinalysis no Micro

## 2018-02-24 LAB
ORGANISM: ABNORMAL
URINE CULTURE, ROUTINE: ABNORMAL
URINE CULTURE, ROUTINE: ABNORMAL

## 2018-04-04 ENCOUNTER — OFFICE VISIT (OUTPATIENT)
Dept: ORTHOPEDIC SURGERY | Age: 63
End: 2018-04-04

## 2018-04-04 VITALS — WEIGHT: 160 LBS | HEIGHT: 66 IN | BODY MASS INDEX: 25.71 KG/M2

## 2018-04-04 DIAGNOSIS — R52 PAIN: ICD-10-CM

## 2018-04-04 DIAGNOSIS — M17.0 PRIMARY OSTEOARTHRITIS OF BOTH KNEES: Primary | ICD-10-CM

## 2018-04-04 PROCEDURE — 99213 OFFICE O/P EST LOW 20 MIN: CPT | Performed by: PHYSICIAN ASSISTANT

## 2018-04-11 ENCOUNTER — TELEPHONE (OUTPATIENT)
Dept: ORTHOPEDIC SURGERY | Age: 63
End: 2018-04-11

## 2018-04-26 DIAGNOSIS — F32.0 MILD SINGLE CURRENT EPISODE OF MAJOR DEPRESSIVE DISORDER (HCC): Primary | ICD-10-CM

## 2018-04-26 DIAGNOSIS — E03.9 ACQUIRED HYPOTHYROIDISM: ICD-10-CM

## 2018-04-26 RX ORDER — LEVOTHYROXINE SODIUM 112 UG/1
112 TABLET ORAL DAILY
Qty: 90 TABLET | Refills: 1 | Status: SHIPPED | OUTPATIENT
Start: 2018-04-26

## 2018-04-26 RX ORDER — CITALOPRAM 40 MG/1
40 TABLET ORAL DAILY
Qty: 90 TABLET | Refills: 1 | Status: SHIPPED | OUTPATIENT
Start: 2018-04-26

## 2018-06-27 DIAGNOSIS — E78.2 MIXED HYPERLIPIDEMIA: ICD-10-CM

## 2018-06-27 LAB
A/G RATIO: 2.2 (ref 1.1–2.2)
ALBUMIN SERPL-MCNC: 4.6 G/DL (ref 3.4–5)
ALP BLD-CCNC: 57 U/L (ref 40–129)
ALT SERPL-CCNC: 13 U/L (ref 10–40)
ANION GAP SERPL CALCULATED.3IONS-SCNC: 12 MMOL/L (ref 3–16)
AST SERPL-CCNC: 16 U/L (ref 15–37)
BILIRUB SERPL-MCNC: 1.1 MG/DL (ref 0–1)
BUN BLDV-MCNC: 9 MG/DL (ref 7–20)
CALCIUM SERPL-MCNC: 9.7 MG/DL (ref 8.3–10.6)
CHLORIDE BLD-SCNC: 101 MMOL/L (ref 99–110)
CHOLESTEROL, TOTAL: 178 MG/DL (ref 0–199)
CO2: 29 MMOL/L (ref 21–32)
CREAT SERPL-MCNC: <0.5 MG/DL (ref 0.6–1.2)
GFR AFRICAN AMERICAN: >60
GFR NON-AFRICAN AMERICAN: >60
GLOBULIN: 2.1 G/DL
GLUCOSE BLD-MCNC: 92 MG/DL (ref 70–99)
HDLC SERPL-MCNC: 67 MG/DL (ref 40–60)
LDL CHOLESTEROL CALCULATED: 98 MG/DL
POTASSIUM SERPL-SCNC: 4 MMOL/L (ref 3.5–5.1)
SODIUM BLD-SCNC: 142 MMOL/L (ref 136–145)
TOTAL PROTEIN: 6.7 G/DL (ref 6.4–8.2)
TRIGL SERPL-MCNC: 65 MG/DL (ref 0–150)
VLDLC SERPL CALC-MCNC: 13 MG/DL

## 2018-06-29 ENCOUNTER — OFFICE VISIT (OUTPATIENT)
Dept: FAMILY MEDICINE CLINIC | Age: 63
End: 2018-06-29

## 2018-06-29 VITALS
WEIGHT: 162.6 LBS | TEMPERATURE: 97.9 F | SYSTOLIC BLOOD PRESSURE: 102 MMHG | DIASTOLIC BLOOD PRESSURE: 60 MMHG | HEART RATE: 66 BPM | HEIGHT: 66 IN | BODY MASS INDEX: 26.13 KG/M2 | OXYGEN SATURATION: 99 %

## 2018-06-29 DIAGNOSIS — M17.11 PRIMARY OSTEOARTHRITIS OF RIGHT KNEE: ICD-10-CM

## 2018-06-29 DIAGNOSIS — Z12.11 ENCOUNTER FOR SCREENING COLONOSCOPY: ICD-10-CM

## 2018-06-29 DIAGNOSIS — E78.2 MIXED HYPERLIPIDEMIA: ICD-10-CM

## 2018-06-29 DIAGNOSIS — R07.89 OTHER CHEST PAIN: ICD-10-CM

## 2018-06-29 DIAGNOSIS — H02.403 PTOSIS OF BOTH EYELIDS: ICD-10-CM

## 2018-06-29 DIAGNOSIS — M17.0 PRIMARY OSTEOARTHRITIS OF BOTH KNEES: ICD-10-CM

## 2018-06-29 DIAGNOSIS — F32.0 MILD SINGLE CURRENT EPISODE OF MAJOR DEPRESSIVE DISORDER (HCC): ICD-10-CM

## 2018-06-29 DIAGNOSIS — Z01.818 PREOP GENERAL PHYSICAL EXAM: Primary | ICD-10-CM

## 2018-06-29 DIAGNOSIS — E03.9 ACQUIRED HYPOTHYROIDISM: ICD-10-CM

## 2018-06-29 PROCEDURE — 99244 OFF/OP CNSLTJ NEW/EST MOD 40: CPT | Performed by: FAMILY MEDICINE

## 2018-06-29 PROCEDURE — 93000 ELECTROCARDIOGRAM COMPLETE: CPT | Performed by: FAMILY MEDICINE

## 2018-06-29 RX ORDER — ATORVASTATIN CALCIUM 10 MG/1
10 TABLET, FILM COATED ORAL NIGHTLY
Qty: 90 TABLET | Refills: 1 | Status: SHIPPED | OUTPATIENT
Start: 2018-06-29 | End: 2020-07-30

## 2018-06-29 NOTE — PROGRESS NOTES
Subjective:      Benjie Nolasco is a 57 y.o.  female who presents to the office today for a preoperative consultation at the request of surgeon Dr. Hernandez Anaya who plans on performing bulptosis and cosmetic brows on July 5. Has had ptosis for years and gettignworse  This consultation is requested for the specific conditions prompting preoperative evaluation (i.e. because of potential affect on operative risk): medicalproblems. Planned anesthesia is [facility], IV sedation and Topical anesthesia. The patient has the following known anesthesia issues:   Patient has a bleeding risk of : no recent abnormal bleeding, no remote history of abnormal bleeding, no use of Ca-channel blockers  Patient does not have objection to receiving blood products if needed.   Past Medical History:   Diagnosis Date    Breast cancer screening 06/07/2017    [facility]/no evidence of malignancy    Cancer ([facility] [address]     squamous cell skin    Chicken pox     Depression     Hypothyroidism     partial thyroidectomy    Meniere's disease     Prolonged emergence from general anesthesia      Patient Active Problem List    Diagnosis Date Noted    Mixed hyperlipidemia 02/16/2018    Primary osteoarthritis of right knee 07/24/2017    Mild single current episode of major depressive disorder ([facility] [address] 04/27/2017    Degenerative tear of meniscus 03/23/2017    Primary osteoarthritis of both knees 03/23/2017    Stress fracture of right tibia 03/10/2017    Tear of medial meniscus of right knee, current 03/10/2017    Bone lesion 03/10/2017    Acquired hypothyroidism 10/30/2016    Female genuine stress incontinence 08/17/2015    SCC (squamous cell carcinoma), lip 01/12/2015    ASNHL (asymmetrical sensorineural hearing loss) 03/07/2012    Bloodgood disease 12/13/2010    Esophageal reflux 06/10/2010    Papanicolaou smear of cervix with low grade squamous intraepithelial lesion (LGSIL) 06/10/2010    Encounter for routine gynecological examination tablet by mouth every 8 hours as needed for Nausea 20 tablet 0    traMADol (ULTRAM) 50 MG tablet ONE EVERY 6 HRS PRN PAIN 30 tablet 0    acetaminophen (TYLENOL) 325 MG tablet Take 650 mg by mouth every 6 hours as needed for Pain      triamterene-hydrochlorothiazide (DYAZIDE) 37.5-25 MG per capsule Take 1 capsule by mouth daily. No current facility-administered medications for this visit.       Allergies   Allergen Reactions    Biaxin [Clarithromycin]     Codeine     Naprosyn [Naproxen]     Naproxen Sodium     Other Swelling     Surgical glue    Morphine Nausea And Vomiting     Review of Systems  Constitutional: negative  Eyes: negative  Ears, nose, mouth, throat, and face: negative  Respiratory: negative  Cardiovascular: negative  Gastrointestinal: negative  Genitourinary:negative  Integument/breast: negative  Hematologic/lymphatic: negative  Musculoskeletal:negative  Neurological: negative  Behavioral/Psych: negative  Endocrine: negative  Allergic/Immunologic: negative      occ chest pain      Objective:      Physical Exam  /60   Pulse 66   Temp 97.9 °F (36.6 °C) (Oral)   Ht 5' 6\" (1.676 m)   Wt 162 lb 9.6 oz (73.8 kg)   LMP 10/20/2012   SpO2 99%   BMI 26.24 kg/m²     General Appearance:  Alert, cooperative, no distress, appears stated age   Head:  Normocephalic, without obvious abnormality, atraumatic   Eyes:  PERRL, conjunctiva/corneas clear, EOM's intact, fundi benign, both eyes ptosis bilat   Ears:  Normal TM's and external ear canals, both ears   Nose: Nares normal, septum midline,mucosa normal, no drainage or sinus tenderness   Throat: Lips, mucosa, and tongue normal; teeth and gums normal   Neck: Supple, symmetrical, trachea midline, no adenopathy;  thyroid: not enlarged, symmetric, no tenderness/mass/nodules; no carotid bruit or JVD   Back:   Symmetric, no curvature, ROM normal, no CVA tenderness   Lungs:   Clear to auscultation bilaterally, respirations unlabored       Heart: Regular rate and rhythm, S1 and S2 normal, no murmur, rub, or gallop   Abdomen:   Soft, non-tender, bowel sounds active all four quadrants,  no masses, no organomegaly   Pelvic: Deferred   Extremities: Extremities normal, atraumatic, no cyanosis or edema   Pulses: 2+ and symmetric   Skin: Skin color, texture, turgor normal, no rashes or lesions   Lymph nodes: Cervical, supraclavicular, and axillary nodes normal   Neurologic: Normal            ekg normal    Assessment:      57 y.o. female with planned surgery as above. Known risk factors for perioperative complications: None        Plan:      1. Preoperative workup as follows none  2. Change in medication regimen before surgery: discontinue ASA 14d before surgery, discontinue NSAIDs (.  ) 14d before surgery     Diagnosis Orders   1. Preop general physical exam  Okay for surgery     2. Ptosis of both eyelids  The condition has deteriorated. It has failed conservative management and needs definitive surgical repair. 3. Acquired hypothyroidism Condition stable continue the medications, treatments, will check labs as appropriate     4. Primary osteoarthritis of both knees Condition stable continue the medications, treatments, will check labs as appropriate     5. Primary osteoarthritis of right knee     6. Mixed hyperlipidemia  Condition stable continue the medications, treatments, will check labs as appropriate       The patient received counseling on the following healthy behaviors: nutrition, exercise, medication adherence and decrease in alcohol consumption    Patient given educational materials on Hyperlipidemia and Nutrition if appropriate    I have instructed the patient to complete a self tracking handout on diet and instructed them to bring it with them to the  next appointment. Discussed use, benefit, and side effects of prescribed medications. Barriers to medication compliance addressed. All patient questions answered. Pt voiced understanding.

## 2019-02-08 ENCOUNTER — OFFICE VISIT (OUTPATIENT)
Dept: ORTHOPEDIC SURGERY | Age: 64
End: 2019-02-08
Payer: MEDICARE

## 2019-02-08 DIAGNOSIS — M17.0 PRIMARY OSTEOARTHRITIS OF BOTH KNEES: Primary | ICD-10-CM

## 2019-02-08 PROCEDURE — 99212 OFFICE O/P EST SF 10 MIN: CPT | Performed by: PHYSICIAN ASSISTANT

## 2019-02-13 ENCOUNTER — TELEPHONE (OUTPATIENT)
Dept: ORTHOPEDIC SURGERY | Age: 64
End: 2019-02-13

## 2019-03-13 ENCOUNTER — NURSE ONLY (OUTPATIENT)
Dept: ORTHOPEDIC SURGERY | Age: 64
End: 2019-03-13
Payer: MEDICARE

## 2019-03-13 DIAGNOSIS — M17.0 PRIMARY OSTEOARTHRITIS OF BOTH KNEES: Primary | ICD-10-CM

## 2019-03-13 PROCEDURE — 20610 DRAIN/INJ JOINT/BURSA W/O US: CPT | Performed by: PHYSICIAN ASSISTANT

## 2019-03-13 PROCEDURE — 99212 OFFICE O/P EST SF 10 MIN: CPT | Performed by: PHYSICIAN ASSISTANT

## 2019-03-20 ENCOUNTER — NURSE ONLY (OUTPATIENT)
Dept: ORTHOPEDIC SURGERY | Age: 64
End: 2019-03-20
Payer: MEDICARE

## 2019-03-20 DIAGNOSIS — M17.0 PRIMARY OSTEOARTHRITIS OF BOTH KNEES: Primary | ICD-10-CM

## 2019-03-20 PROCEDURE — 99211 OFF/OP EST MAY X REQ PHY/QHP: CPT | Performed by: PHYSICIAN ASSISTANT

## 2019-03-20 PROCEDURE — 20611 DRAIN/INJ JOINT/BURSA W/US: CPT | Performed by: PHYSICIAN ASSISTANT

## 2019-03-27 ENCOUNTER — NURSE ONLY (OUTPATIENT)
Dept: ORTHOPEDIC SURGERY | Age: 64
End: 2019-03-27
Payer: MEDICARE

## 2019-03-27 DIAGNOSIS — M17.0 PRIMARY OSTEOARTHRITIS OF BOTH KNEES: Primary | ICD-10-CM

## 2019-11-06 ENCOUNTER — OFFICE VISIT (OUTPATIENT)
Dept: ORTHOPEDIC SURGERY | Age: 64
End: 2019-11-06
Payer: MEDICARE

## 2019-11-06 VITALS — HEIGHT: 65 IN | WEIGHT: 145 LBS | BODY MASS INDEX: 24.16 KG/M2

## 2019-11-06 DIAGNOSIS — M17.0 PRIMARY OSTEOARTHRITIS OF BOTH KNEES: ICD-10-CM

## 2019-11-06 DIAGNOSIS — M25.562 PAIN IN BOTH KNEES, UNSPECIFIED CHRONICITY: Primary | ICD-10-CM

## 2019-11-06 DIAGNOSIS — M25.561 PAIN IN BOTH KNEES, UNSPECIFIED CHRONICITY: Primary | ICD-10-CM

## 2019-11-06 PROCEDURE — 99213 OFFICE O/P EST LOW 20 MIN: CPT | Performed by: PHYSICIAN ASSISTANT

## 2019-11-26 ENCOUNTER — TELEPHONE (OUTPATIENT)
Dept: ORTHOPEDIC SURGERY | Age: 64
End: 2019-11-26

## 2019-12-06 ENCOUNTER — NURSE ONLY (OUTPATIENT)
Dept: ORTHOPEDIC SURGERY | Age: 64
End: 2019-12-06
Payer: MEDICARE

## 2019-12-06 DIAGNOSIS — M17.0 PRIMARY OSTEOARTHRITIS OF BOTH KNEES: Primary | ICD-10-CM

## 2019-12-06 RX ORDER — HYALURONATE SODIUM 10 MG/ML
40 SYRINGE (ML) INTRAARTICULAR ONCE
Status: COMPLETED | OUTPATIENT
Start: 2019-12-06 | End: 2019-12-06

## 2019-12-06 RX ADMIN — Medication 40 MG: at 08:09

## 2019-12-13 ENCOUNTER — NURSE ONLY (OUTPATIENT)
Dept: ORTHOPEDIC SURGERY | Age: 64
End: 2019-12-13
Payer: MEDICARE

## 2019-12-13 DIAGNOSIS — M17.0 PRIMARY OSTEOARTHRITIS OF BOTH KNEES: Primary | ICD-10-CM

## 2019-12-13 RX ORDER — HYALURONATE SODIUM 10 MG/ML
40 SYRINGE (ML) INTRAARTICULAR ONCE
Status: COMPLETED | OUTPATIENT
Start: 2019-12-13 | End: 2019-12-13

## 2019-12-13 RX ADMIN — Medication 40 MG: at 07:26

## 2019-12-20 ENCOUNTER — NURSE ONLY (OUTPATIENT)
Dept: ORTHOPEDIC SURGERY | Age: 64
End: 2019-12-20
Payer: MEDICARE

## 2019-12-20 DIAGNOSIS — M17.0 PRIMARY OSTEOARTHRITIS OF BOTH KNEES: Primary | ICD-10-CM

## 2019-12-20 PROCEDURE — 20611 DRAIN/INJ JOINT/BURSA W/US: CPT | Performed by: PHYSICIAN ASSISTANT

## 2019-12-20 RX ORDER — HYALURONATE SODIUM 10 MG/ML
40 SYRINGE (ML) INTRAARTICULAR ONCE
Status: COMPLETED | OUTPATIENT
Start: 2019-12-20 | End: 2019-12-20

## 2019-12-20 RX ADMIN — Medication 40 MG: at 08:08

## 2019-12-31 ENCOUNTER — TELEPHONE (OUTPATIENT)
Dept: ORTHOPEDIC SURGERY | Age: 64
End: 2019-12-31

## 2020-07-30 ENCOUNTER — OFFICE VISIT (OUTPATIENT)
Dept: ORTHOPEDIC SURGERY | Age: 65
End: 2020-07-30
Payer: MEDICARE

## 2020-07-30 VITALS — HEIGHT: 65 IN | BODY MASS INDEX: 24.16 KG/M2 | WEIGHT: 145 LBS

## 2020-07-30 PROCEDURE — 99213 OFFICE O/P EST LOW 20 MIN: CPT | Performed by: ORTHOPAEDIC SURGERY

## 2020-07-30 RX ORDER — ATORVASTATIN CALCIUM 10 MG/1
TABLET, FILM COATED ORAL
COMMUNITY
Start: 2020-01-22

## 2020-07-30 NOTE — PROGRESS NOTES
Chief Complaint    Knee Pain (LEFT knee pain persists with WB activity; has failed visco and cortisone; would like to discuss treatment, possible TKR)      History of Present Illness:  Anna Ruiz is a 75 y.o. female who returns today for follow-up on her left knee pain and for evaluation of left hip pain. Patient states that the Euflexxa injections that she last received in the left knee gave her several months of benefit but at this point it has worn off and she is now having moderate symptoms on a regular basis within the medial and anterior aspect of her left knee. She states that the pain is exacerbated with ascending descending stairs and with prolonged weightbearing activity. Patient does have a history of a left femoral hernia. She is also experiencing moderate plus pain within the left groin with weightbearing activity and with certain range of motion. She is ambulating with a slight limp favoring the left lower extremity. Pain Assessment  Location of Pain: Knee  Location Modifiers: Left  Severity of Pain: 10(at worst; currently 2/10)  Quality of Pain: Throbbing, Sharp, Dull, Aching  Duration of Pain: Persistent  Frequency of Pain: Intermittent  Aggravating Factors: Walking, Standing, Bending, Exercise, Stairs  Limiting Behavior: Some  Relieving Factors: Rest  Result of Injury: No]         Medical History:  Patient's medications, allergies, past medical, surgical, social and family histories were reviewed and updated as appropriate. Review of Systems:  Relevant review of systems reviewed and available in the patient's chart    Vital Signs: There were no vitals filed for this visit. General Exam:   Constitutional: Patient is adequately groomed with no evidence of malnutrition  DTRs: Deep tendon reflexes are intact  Mental Status: The patient is oriented to time, place and person. The patient's mood and affect are appropriate.   Lymphatic: The lymphatic examination bilaterally reveals all areas to be without enlargement or induration. Vascular: Examination reveals no swelling or calf tenderness. Peripheral pulses are palpable and 2+. Neurological: The patient has good coordination. There is no weakness or sensory deficit. Body mass index is 24.13 kg/m². Left hip Examination:    Inspection:  No erythema or signs of infection. There are no cutaneous lesions. Palpation: There is tenderness to palpation over the left hip minimal palpable pain over the lateral hip. Range of Motion:  Painful limited range of motion of the hip particularly with flexion and external rotation causing reproducible groin pain. Strength:  Core/5 strength in flexion and abduction limited by pain. Special Tests: There is a positive log roll maneuver. Positive straight leg raise against resistance. Negative Laure's test.  Negative Homans test.    Skin: There are no rashes, ulcerations or lesions. Gait: Antalgic favoring the left side    Examination of the left knee reveals intact skin. There is  focal tenderness over the medial aspect and mildly over the patellofemoral joint. The patient demonstrates full range of motion with regard to flexion and extension. Strength is -5 /5 throughout all planes. Ligamentous stability is grossly intact. Varus stress testing does reproduce her pain over the medial compartment mildly with patella compression. Radiology:     X-rays obtained and reviewed in office:  Views AP pelvis and AP, lateral, sunrise view, tunnel view left knee  Location pelvis and left knee  Impression there is moderate to severe osteoarthritis noted within the medial compartment of left knee with peripheral osteophyte formation and subchondral sclerosis. There is mild to moderate osteoarthritis noted within the patellofemoral joint with peripheral osteophyte formation noted. AP of the pelvis reveals only mild arthritic changes within the left hip.     Impression:  Encounter Diagnoses   Name Primary?  Primary osteoarthritis of left knee Yes    Pain of left hip joint        Office Procedures:  Orders Placed This Encounter   Procedures    XR KNEE LEFT (MIN 4 VIEWS)     Standing Status:   Future     Number of Occurrences:   1     Standing Expiration Date:   7/30/2021    XR PELVIS (1-2 VIEWS)     Standing Status:   Future     Number of Occurrences:   1     Standing Expiration Date:   7/30/2021    MRI HIP LEFT WO CONTRAST     Standing Status:   Future     Standing Expiration Date:   7/30/2021     Scheduling Instructions:      [phone]      MRI LT HIP W/O CONTRAST       PAIN R/O AVN      [facility]            SCHED: ONCE AUTHORIZED       Treatment Plan:  I discussed with the patient the nature of osteoarthritis of the hip. We talked about treatment of arthritis and the various options that are involved with this. The patient understands that the treatments can vary from essentially doing nothing to a total joint replacement arthroplasty for arthritis. I then went on to describe the utilization of glucosamine and chondroitin sulfate as a joint nutrition product. We talked about the fact that this is essentially a joint vitamin with typically minimal side effects. We also talked about utilization of prescription over-the-counter anti-inflammatory medications as the next option. We also talked about the corticosteroid injections and the fact that this can give a brief window of relief, but does not cure the problem; in fact, the pain often has a rebound effect in 6-10 weeks after the steroid has worn off. Lastly we discussed total joint replacement arthroplasty as the final and definitive step in treatment of arthritis. Patient realizes the magnitude of this type of treatment as well as having voiced a general understanding to the duration of the prosthesis. The patient voiced understanding to these continuum of treatment options.   Today we have had a discussion regarding treatment of her arthritic left knee to include total knee arthroplasty versus partial which with her symptoms she did be a better candidate for total knee arthroplasty. We are also going to order an MRI of the left hip to rule out AVN and potential stress fracture. Once the MRI is obtained patient will return to the office for review of the results of the MRI and she will need a general surgery referral for her left femoral hernia.

## 2020-08-13 ENCOUNTER — TELEPHONE (OUTPATIENT)
Dept: ORTHOPEDIC SURGERY | Age: 65
End: 2020-08-13

## 2020-08-13 NOTE — TELEPHONE ENCOUNTER
Spoke to patient. Gave her MRI results. Asked her to be 50% weightbearing because of the subchondral marrow edema. I would like to see her in 10-14 days to assess her response to the protected weightbearing. Use walker at all times. Patient also has moderate arthritis.

## 2020-08-14 ENCOUNTER — TELEPHONE (OUTPATIENT)
Dept: ORTHOPEDIC SURGERY | Age: 65
End: 2020-08-14

## 2020-08-14 NOTE — TELEPHONE ENCOUNTER
Spoke w/ Hilda Monterroso yesterday about her MRI. Said she had a stress fracture. She has additional questions. Please call her.

## 2020-08-31 ENCOUNTER — OFFICE VISIT (OUTPATIENT)
Dept: ORTHOPEDIC SURGERY | Age: 65
End: 2020-08-31
Payer: MEDICARE

## 2020-08-31 PROCEDURE — 99213 OFFICE O/P EST LOW 20 MIN: CPT | Performed by: ORTHOPAEDIC SURGERY

## 2020-08-31 NOTE — PROGRESS NOTES
Chief Complaint    Results (mri lt hip)      History of Present Illness:  Mandeep Sullivan is a 75 y.o. female presents to the office today for a follow-up visit. Patient is here for MRI results left hip. She does continue to complain of groin and lateral hip pain. She did have an MRI which demonstrated moderate osteoarthritis and subchondral marrow edema within the femoral head. She was contacted with MRI results and instructed to be 50% weightbearing. She did this for a period of time and her pain did improve but never completely resolved. No new injury or trauma. She denies lumbar pain and radicular symptoms.      Pain Assessment  Location of Pain: Pelvis  Location Modifiers: Left  Severity of Pain: 0  Frequency of Pain: Intermittent  Aggravating Factors: Standing, Walking  Limiting Behavior: Some  Work-Related Injury: No  Are there other pain locations you wish to document?: No]    Medical History:  Past Medical History:   Diagnosis Date    Breast cancer screening 06/07/2017    [facility]/no evidence of malignancy    Cancer ([address]     squamous cell skin    Chicken pox     Depression     Hypothyroidism     partial thyroidectomy    Meniere's disease     Osteoarthritis     Prolonged emergence from general anesthesia      Patient Active Problem List    Diagnosis Date Noted    Mixed hyperlipidemia 02/16/2018    Primary osteoarthritis of right knee 07/24/2017    Mild single current episode of major depressive disorder ([address] 04/27/2017    Degenerative tear of meniscus 03/23/2017    Primary osteoarthritis of both knees 03/23/2017    Stress fracture of right tibia 03/10/2017    Tear of medial meniscus of right knee, current 03/10/2017    Bone lesion 03/10/2017    Acquired hypothyroidism 10/30/2016    Female genuine stress incontinence 08/17/2015    SCC (squamous cell carcinoma), lip 01/12/2015    ASNHL (asymmetrical sensorineural hearing loss) 03/07/2012    Bloodgood disease 12/13/2010    Esophageal reflux 06/10/2010    Papanicolaou smear of cervix with low grade squamous intraepithelial lesion (LGSIL) 06/10/2010    Anxiety state 06/10/2010    Anemia 06/10/2010    Meniere's disease 06/10/2010    Tear of medial cartilage or meniscus of knee, current 06/10/2010     Past Surgical History:   Procedure Laterality Date    BARTHOLIN GLAND CYST EXCISION      BUNIONECTOMY Right     CARPAL TUNNEL RELEASE Bilateral     DILATION AND CURETTAGE OF UTERUS      FOOT SURGERY Right     halux rig    HYSTERECTOMY      KNEE ARTHROSCOPY Bilateral     LAPAROSCOPY      MASTOID SURGERY Right     shunt for Meniere's disease    MISAEL AND BSO      THYROIDECTOMY      partial    WISDOM TOOTH EXTRACTION       Family History   Problem Relation Age of Onset    High Blood Pressure Mother     Stroke Mother     High Blood Pressure Sister     Arthritis Maternal Grandmother     Diabetes Maternal Grandmother     Cancer Paternal Grandmother     Cancer Sister         face     Social History     Socioeconomic History    Marital status:      Spouse name: Not on file    Number of children: Not on file    Years of education: Not on file    Highest education level: Not on file   Occupational History    Not on file   Social Needs    Financial resource strain: Not on file    Food insecurity     Worry: Not on file     Inability: Not on file    Transportation needs     Medical: Not on file     Non-medical: Not on file   Tobacco Use    Smoking status: Never Smoker    Smokeless tobacco: Never Used   Substance and Sexual Activity    Alcohol use: Yes     Comment: 0-1 drinks per month    Drug use: No    Sexual activity: Not on file   Lifestyle    Physical activity     Days per week: Not on file     Minutes per session: Not on file    Stress: Not on file   Relationships    Social connections     Talks on phone: Not on file     Gets together: Not on file     Attends [employer] service: Not on file     Active member of club or organization: Not on file     Attends meetings of clubs or organizations: Not on file     Relationship status: Not on file    Intimate partner violence     Fear of current or ex partner: Not on file     Emotionally abused: Not on file     Physically abused: Not on file     Forced sexual activity: Not on file   Other Topics Concern    Not on file   Social History Narrative    Not on file     Current Outpatient Medications   Medication Sig Dispense Refill    atorvastatin (LIPITOR) 10 MG tablet TAKE ONE TABLET BY MOUTH DAILY      citalopram (CELEXA) 40 MG tablet Take 1 tablet by mouth daily 90 tablet 1    levothyroxine (SYNTHROID) 112 MCG tablet Take 1 tablet by mouth daily 90 tablet 1    meclizine (ANTIVERT) 25 MG tablet Take 25 mg by mouth 3 times daily as needed      Sennosides-Docusate Sodium (SENOKOT S PO) Take by mouth daily      ondansetron (ZOFRAN) 4 MG tablet Take 1 tablet by mouth every 8 hours as needed for Nausea 20 tablet 0    traMADol (ULTRAM) 50 MG tablet ONE EVERY 6 HRS PRN PAIN 30 tablet 0    acetaminophen (TYLENOL) 325 MG tablet Take 650 mg by mouth every 6 hours as needed for Pain      triamterene-hydrochlorothiazide (DYAZIDE) 37.5-25 MG per capsule Take 1 capsule by mouth daily. No current facility-administered medications for this visit.       Current Outpatient Medications on File Prior to Visit   Medication Sig Dispense Refill    atorvastatin (LIPITOR) 10 MG tablet TAKE ONE TABLET BY MOUTH DAILY      citalopram (CELEXA) 40 MG tablet Take 1 tablet by mouth daily 90 tablet 1    levothyroxine (SYNTHROID) 112 MCG tablet Take 1 tablet by mouth daily 90 tablet 1    meclizine (ANTIVERT) 25 MG tablet Take 25 mg by mouth 3 times daily as needed      Sennosides-Docusate Sodium (SENOKOT S PO) Take by mouth daily      ondansetron (ZOFRAN) 4 MG tablet Take 1 tablet by mouth every 8 hours as needed for Nausea 20 tablet 0    traMADol (ULTRAM) 50 MG tablet ONE EVERY 6 HRS PRN PAIN 30 tablet 0    acetaminophen (TYLENOL) 325 MG tablet Take 650 mg by mouth every 6 hours as needed for Pain      triamterene-hydrochlorothiazide (DYAZIDE) 37.5-25 MG per capsule Take 1 capsule by mouth daily. No current facility-administered medications on file prior to visit. Review of Systems:  Relevant review of systems reviewed and available in the patient's chart    Vital Signs: There were no vitals filed for this visit. General Exam:   Constitutional: Patient is adequately groomed with no evidence of malnutrition  DTRs: Deep tendon reflexes are intact  Mental Status: The patient is oriented to time, place and person. The patient's mood and affect are appropriate. Lymphatic: The lymphatic examination bilaterally reveals all areas to be without enlargement or induration. Vascular: Examination reveals no swelling or calf tenderness. Peripheral pulses are palpable and 2+. Neurological: The patient has good coordination. There is no weakness or sensory deficit. There is no height or weight on file to calculate BMI. Left hip Examination:    Inspection:  No erythema or signs of infection. There are no cutaneous lesions. Palpation: Mild tenderness to palpation over the greater trochanteric region. Range of Motion:  Painful limited range of motion of the hip particularly with flexion and external rotation causing reproducible groin pain. Strength:  Core/5 strength in flexion and abduction limited by pain. Special Tests: There is a positive log roll maneuver. Positive straight leg raise against resistance. Negative Laure's test.  Negative Homans test.    Skin: There are no rashes, ulcerations or lesions. Gait: Antalgic favoring the right side    Reflex 2+ patellar    Additional Comments:       Additional Examinations:         Contralateral Exam: Examination of the right hip reveals intact skin.  The patient demonstrates full painless range of motion with regards to flexion, abduction, internal and external rotation. There is no tenderness about the greater trochanter. There is a negative straight leg raise against resistance. Strength is 5/5 throughout all planes. Lower Back: Examination of the lower back does not show any tenderness, deformity or injury. Range of motion is unremarkable. There is no gross instability. There are no rashes, ulcerations or lesions. Strength and tone are normal.    Radiology:       MRI results    Site: [facility] #: [id number] #: [id number] Procedure: MR Left Hip w/o Contrast ; Reason for Exam: Pain of left hip. r/o AVN    This document is confidential medical information.  Unauthorized disclosure or use of this information is prohibited by law. If you are not the intended recipient of this document, please advise us by calling immediately [phone].         [facility] [place] [address]              Patient Name: Regine Palafox    Case ID: [id number]    Patient : 1955    Referring Physician: Jerrod Romero MD    Exam Date: 2020    Exam Description: MR Left Hip w/o Contrast              HISTORY: 26-year-old female with left hip pain for 3 to 4 months with no known injury. No prior     surgery to the left hip.  Evaluate for avascular necrosis.         TECHNICAL FACTORS: Long- and short-axis fat- and water-weighted images were performed.         COMPARISON:  None.         FINDINGS:  Moderate-severity left hip osteoarthritis is present with small multifocal left hip    spurs, multiple subchondral cysts and subchondral bone marrow reaction within the superomedial    femoral head (which suggests the presence of subtle overlying chondromalacia) and multiple    small subchondral cysts within the posterior, anterior acetabular rim.         Osteoarthritis of the contralateral right hip is also present which is only included on the    coronal T1 and axial T2 fat-sat sequences which includes both hips is CONCLUSION:    1. Moderate-severity left hip osteoarthritis is present with subchondral cysts and adjacent    subchondral bone marrow reaction within the superomedial humeral head (which suggests the    presence of subtle overlying chondromalacia, multiple small subchondral cysts within the left    posterior and anterior acetabular rim and small multifocal left hip spurs. Osteoarthritis of    the contralateral right hip is also present which is not optimally evaluated on this left hip    MRI examination. The bone marrow signal alteration within the superior left femoral head is    believed to be entirely arthropathic in etiology, and no avascular necrosis is believed to be    present    2. Tear of the left anterosuperior acetabular labrum which is most likely degenerative in    nature. 3. Small low-grade partial-thickness interstitial tenoosseous separations/tears of the left    (and contralateral right) common hamstring tendon at their origin on the ischial tuberosity. 4. Additional findings and pertinent negatives as detailed above. Impression:  Encounter Diagnosis   Name Primary?  Primary osteoarthritis of left hip Yes       Office Procedures:  No orders of the defined types were placed in this encounter. Treatment Plan:  I discussed with the patient the nature of osteoarthritis of the hip. We talked about treatment of arthritis and the various options that are involved with this. The patient understands that the treatments can vary from essentially doing nothing to a total joint replacement arthroplasty for arthritis. I then went on to describe the utilization of glucosamine and chondroitin sulfate as a joint nutrition product. We talked about the fact that this is essentially a joint vitamin with typically minimal side effects. We also talked about utilization of prescription over-the-counter anti-inflammatory medications as the next option.   We also talked about the corticosteroid injections and the fact that this can give a brief window of relief, but does not cure the problem; in fact, the pain often has a rebound effect in 6-10 weeks after the steroid has worn off. Lastly we discussed total joint replacement arthroplasty as the final and definitive step in treatment of arthritis. Patient realizes the magnitude of this type of treatment as well as having voiced a general understanding to the duration of the prosthesis. The patient voiced understanding to these continuum of treatment options. Patient will follow-up in 2 weeks. Clinical exam only. Patient is to consider proceeding with a left total hip arthroplasty. She will continue with her conservative care including activity modifications and over-the-counter medications.

## 2020-09-17 ENCOUNTER — OFFICE VISIT (OUTPATIENT)
Dept: ORTHOPEDIC SURGERY | Age: 65
End: 2020-09-17
Payer: MEDICARE

## 2020-09-17 VITALS — HEIGHT: 65 IN | BODY MASS INDEX: 24.16 KG/M2 | WEIGHT: 145 LBS

## 2020-09-17 PROCEDURE — 99213 OFFICE O/P EST LOW 20 MIN: CPT | Performed by: ORTHOPAEDIC SURGERY

## 2020-09-17 NOTE — PROGRESS NOTES
Chief Complaint  Left hip pain, left knee pain. Hip Pain (LEFT hip pain discuss options for treatment; has been using cane for limited WB and limiting activities)      History of Present Illness:  Vishal Tayolr is a 75 y.o. female presents to the office today for a follow-up visit. Patient has seen us in the past for left knee and left hip osteoarthritis. She continues to have pain primarily in the groin rating down the thigh. .  She states that she does not have pain if she does not do anything but is not happy with his quality of life. She would like to not have to use a cane as well as be able to play with her grandchildren and mow her lawn. She does not take pain medication. She has had an extensive number of injections into her left knee and states that it is time for knee replacement. She has both hip and knee pain. We talked her last time about perhaps doing a hip replacement followed by a knee replacement but currently her pain is fairly minimal, but she states that she has not been doing anything and again this is affecting her quality life and activities of daily living.      Pain Assessment  Location of Pain: Pelvis(hip)  Location Modifiers: Left  Severity of Pain: 0(currently; at worst with ADL's 1-2/10)  Quality of Pain: Dull  Duration of Pain: Persistent  Frequency of Pain: Intermittent  Aggravating Factors: Walking, Standing  Limiting Behavior: Some  Relieving Factors: Rest]    Medical History:  Past Medical History:   Diagnosis Date    Breast cancer screening 06/07/2017    [facility]/no evidence of malignancy    Cancer ([address]     squamous cell skin    Chicken pox     Depression     Hypothyroidism     partial thyroidectomy    Meniere's disease     Osteoarthritis     Prolonged emergence from general anesthesia      Patient Active Problem List    Diagnosis Date Noted    Mixed hyperlipidemia 02/16/2018    Primary osteoarthritis of right knee 07/24/2017    Mild single current episode of Smokeless tobacco: Never Used   Substance and Sexual Activity    Alcohol use: Yes     Comment: 0-1 drinks per month    Drug use: No    Sexual activity: None   Lifestyle    Physical activity     Days per week: None     Minutes per session: None    Stress: None   Relationships    Social connections     Talks on phone: None     Gets together: None     Attends [employer] service: None     Active member of club or organization: None     Attends meetings of clubs or organizations: None     Relationship status: None    Intimate partner violence     Fear of current or ex partner: None     Emotionally abused: None     Physically abused: None     Forced sexual activity: None   Other Topics Concern    None   Social History Narrative    None     Current Outpatient Medications   Medication Sig Dispense Refill    atorvastatin (LIPITOR) 10 MG tablet TAKE ONE TABLET BY MOUTH DAILY      citalopram (CELEXA) 40 MG tablet Take 1 tablet by mouth daily 90 tablet 1    levothyroxine (SYNTHROID) 112 MCG tablet Take 1 tablet by mouth daily 90 tablet 1    meclizine (ANTIVERT) 25 MG tablet Take 25 mg by mouth 3 times daily as needed      Sennosides-Docusate Sodium (SENOKOT S PO) Take by mouth daily      ondansetron (ZOFRAN) 4 MG tablet Take 1 tablet by mouth every 8 hours as needed for Nausea 20 tablet 0    traMADol (ULTRAM) 50 MG tablet ONE EVERY 6 HRS PRN PAIN 30 tablet 0    acetaminophen (TYLENOL) 325 MG tablet Take 650 mg by mouth every 6 hours as needed for Pain      triamterene-hydrochlorothiazide (DYAZIDE) 37.5-25 MG per capsule Take 1 capsule by mouth daily. No current facility-administered medications for this visit.       Current Outpatient Medications on File Prior to Visit   Medication Sig Dispense Refill    atorvastatin (LIPITOR) 10 MG tablet TAKE ONE TABLET BY MOUTH DAILY      citalopram (CELEXA) 40 MG tablet Take 1 tablet by mouth daily 90 tablet 1    levothyroxine (SYNTHROID) 112 MCG tablet Take 1 tablet by mouth daily 90 tablet 1    meclizine (ANTIVERT) 25 MG tablet Take 25 mg by mouth 3 times daily as needed      Sennosides-Docusate Sodium (SENOKOT S PO) Take by mouth daily      ondansetron (ZOFRAN) 4 MG tablet Take 1 tablet by mouth every 8 hours as needed for Nausea 20 tablet 0    traMADol (ULTRAM) 50 MG tablet ONE EVERY 6 HRS PRN PAIN 30 tablet 0    acetaminophen (TYLENOL) 325 MG tablet Take 650 mg by mouth every 6 hours as needed for Pain      triamterene-hydrochlorothiazide (DYAZIDE) 37.5-25 MG per capsule Take 1 capsule by mouth daily. No current facility-administered medications on file prior to visit. Review of Systems:  Relevant review of systems reviewed and available in the patient's chart    Vital Signs: There were no vitals filed for this visit. General Exam:   Constitutional: Patient is adequately groomed with no evidence of malnutrition  DTRs: Deep tendon reflexes are intact  Mental Status: The patient is oriented to time, place and person. The patient's mood and affect are appropriate. Lymphatic: The lymphatic examination bilaterally reveals all areas to be without enlargement or induration. Vascular: Examination reveals no swelling or calf tenderness. Peripheral pulses are palpable and 2+. Neurological: The patient has good coordination. There is no weakness or sensory deficit. Body mass index is 24.13 kg/m². Left and right knee Examination:     Inspection:  No erythema or signs of infection. There are no cutaneous lesions     Palpation:  There is tenderness to palpation along the medial joint line. She does have patellofemoral pain.     Range of Motion:  0 extension to 115 left of active flexion.     Strength:  4+/5 quadriceps and hamstrings     Special Tests: The knee is stable to varus valgus stressing/anterior posterior drawer.  Negative Homans test.                                Skin: There are no rashes, ulcerations or lesions.     Gait: mildly antalgic favoring the left side     Reflex 2+ patellar      Left hip Examination:    Inspection:  No erythema or signs of infection. There are no cutaneous lesions. Palpation: Minimal tenderness to palpation over the greater trochanteric region. Range of Motion:  Painful limited range of motion of the hip particularly with flexion and external rotation causing reproducible groin pain. Complains mostly of groin pain and not of lateral hip pain. Strength:  4/5 strength in flexion and abduction limited by pain. Special Tests: There is a positive log roll maneuver. Positive straight leg raise against resistance. Negative Laure's test.  Negative Homans test.    Skin: There are no rashes, ulcerations or lesions. Gait: Antalgic favoring the right side    Reflex 2+ patellar    Additional Comments:       Additional Examinations:         Right hip Exam: Examination of the right hip reveals intact skin. The patient demonstrates full painless range of motion with regards to flexion, abduction, internal and external rotation. There is no tenderness about the greater trochanter. There is a negative straight leg raise against resistance. Strength is 5/5 throughout all planes. Lower Back: Examination of the lower back does not show any tenderness, deformity or injury. Range of motion is unremarkable. There is no gross instability. There are no rashes, ulcerations or lesions. Strength and tone are normal.    Radiology:       No new imaging obtained today        Impression:  Left hip and knee osteoarthritis     Office Procedures:  None    Failed Outpatient management or Previous Surgical Intervention  Patient has undergone conservative treatment of left hip and knee for the past 2 years.   Patient has undergone therapy consisting of at least 3 months of physical therapy which included muscle strengthening and flexibility program, cortisone injections, Visco supplementation, different oral medications including NSAIDs and analgesics, efforts at weight loss, and activity modification years with no improvement in  pain relief or function. Treatment Plan:  I discussed with the patient the nature of osteoarthritis of the hip and knee. We talked about treatment of arthritis and the various options that are involved with this. The patient understands that the treatments can vary from essentially doing nothing to a total joint replacement arthroplasty for arthritis. I then went on to describe the utilization of glucosamine and chondroitin sulfate as a joint nutrition product. We talked about the fact that this is essentially a joint vitamin with typically minimal side effects. We also talked about utilization of prescription over-the-counter anti-inflammatory medications as the next option. We also discussed the possibility of brace wear or orthotic wear if the patient has significant varus alignment. We then went on to discuss the possibility of Visco supplementation with hyaluronate products. We talked about the typical course of this type of treatment and the fact that often times in the treatment for significant arthritis, this is successful less than half the time. We also talked about the corticosteroid injections and the fact that this can give a brief window of relief, but does not cure the problem; in fact, the pain often has a rebound effect in 6-10 weeks after the steroid has worn off. We also discussed arthroscopy surgery in attempts to debride the joint, but the fact that this is relatively unreliable treatment in the face of significant arthritis. It can occasionally be used, particularly if there is significant meniscus pathology. Lastly we discussed total joint replacement arthroplasty as the final and definitive step in treatment of arthritis. Patient realizes the magnitude of this type of treatment as well as having voiced a general understanding to the duration of the prosthesis.  The patient voiced understanding to these continuum of treatment options. At this point the patient has failed conservative treatment as mentioned above. She is unsure at this time if her left knee or left hip is bothering her more. She has not been doing much with regard to her left side as instructed by us. However at this point she is ready to proceed with some sort of surgery she is just unsure if she would rather have her left hip or knee done. She will go ahead and back to her normal activities for the next week. Would like to see her back on Wednesday and at that point she will let us know if she rather proceed with a left knee or left hip total arthroplasty. All of her questions were answered. Most probably she will be a good candidate for an anterior approach left total hip arthroplasty with robotic assistance. We will further discuss the options we see her on Wednesday.

## 2020-09-25 ENCOUNTER — TELEPHONE (OUTPATIENT)
Dept: ORTHOPEDIC SURGERY | Age: 65
End: 2020-09-25

## 2020-09-28 ENCOUNTER — OFFICE VISIT (OUTPATIENT)
Dept: ORTHOPEDIC SURGERY | Age: 65
End: 2020-09-28
Payer: MEDICARE

## 2020-09-28 PROCEDURE — 99214 OFFICE O/P EST MOD 30 MIN: CPT | Performed by: PHYSICIAN ASSISTANT

## 2020-09-28 NOTE — PROGRESS NOTES
Chief Complaint    Hip Pain (LEFT hip pain; discuss THR vs knee replacement and timing of surgeries)      History of Present Illness:  Duke Haque is a 75 y.o. female who returns today for evaluation and treatment regarding her left hip. On her last visit last week she was having a hard time determining if she was having more troubles with her hip and her knee. She understands of both of these need to be replaced at some point. Over the weekend she did increase her activity little bit and tried cooking for about 8 people. She is had a significant increase in her left hip pain since then. She complains of a lot of increased groin pain and even difficulty sitting for any length of time. It severely affecting her quality of life and level of activity. At this time she is made the decision to move forward with total hip replacement surgery. Is causing too many limitations with daily life and causes sleep disturbances at night. PAIN:   Pain Assessment  Location of Pain: Pelvis(hip)  Location Modifiers: Left  Severity of Pain: 5  Quality of Pain: Dull, Aching  Duration of Pain: Persistent  Frequency of Pain: Constant  Aggravating Factors: Walking, Standing, Bending, Straightening  Limiting Behavior: Some  Relieving Factors: Rest    The patients chronic pain has gradually worsening over the last 12 months. The patient rates pain on a level of 8/10. Pain impacts patients ability to drive, sleep and climb stairs.       Medical History:   Past Medical History:   Diagnosis Date    Breast cancer screening 06/07/2017    [facility]/no evidence of malignancy    Cancer ([facility] [address]     squamous cell skin    Chicken pox     Depression     Hypothyroidism     partial thyroidectomy    Meniere's disease     Osteoarthritis     Prolonged emergence from general anesthesia      Patient Active Problem List    Diagnosis Date Noted    Mixed hyperlipidemia 02/16/2018    Primary osteoarthritis of right knee 07/24/2017    Mild single current episode of major depressive disorder ([facility] [address] 04/27/2017    Degenerative tear of meniscus 03/23/2017    Primary osteoarthritis of both knees 03/23/2017    Stress fracture of right tibia 03/10/2017    Tear of medial meniscus of right knee, current 03/10/2017    Bone lesion 03/10/2017    Acquired hypothyroidism 10/30/2016    Female genuine stress incontinence 08/17/2015    SCC (squamous cell carcinoma), lip 01/12/2015    ASNHL (asymmetrical sensorineural hearing loss) 03/07/2012    Bloodgood disease 12/13/2010    Esophageal reflux 06/10/2010    Papanicolaou smear of cervix with low grade squamous intraepithelial lesion (LGSIL) 06/10/2010    Anxiety state 06/10/2010    Anemia 06/10/2010    Meniere's disease 06/10/2010    Tear of medial cartilage or meniscus of knee, current 06/10/2010     Current Outpatient Medications   Medication Sig Dispense Refill    atorvastatin (LIPITOR) 10 MG tablet TAKE ONE TABLET BY MOUTH DAILY      citalopram (CELEXA) 40 MG tablet Take 1 tablet by mouth daily 90 tablet 1    levothyroxine (SYNTHROID) 112 MCG tablet Take 1 tablet by mouth daily 90 tablet 1    meclizine (ANTIVERT) 25 MG tablet Take 25 mg by mouth 3 times daily as needed      Sennosides-Docusate Sodium (SENOKOT S PO) Take by mouth daily      ondansetron (ZOFRAN) 4 MG tablet Take 1 tablet by mouth every 8 hours as needed for Nausea 20 tablet 0    traMADol (ULTRAM) 50 MG tablet ONE EVERY 6 HRS PRN PAIN 30 tablet 0    acetaminophen (TYLENOL) 325 MG tablet Take 650 mg by mouth every 6 hours as needed for Pain      triamterene-hydrochlorothiazide (DYAZIDE) 37.5-25 MG per capsule Take 1 capsule by mouth daily. No current facility-administered medications for this visit. Medication Management  Patient has been treated with NSAIDs, Steroids and Analgesics with Minimal relief for 12 months.     Review of Systems:  Relevant review of systems dated 9/28/2020 was reviewed and available in the patient's chart under the media tab. Vital Signs: There were no vitals filed for this visit. There is no height or weight on file to calculate BMI. Limitation in Activities of Daily Living (ADLs)  The patient is able to ambulate with the assistance of cane for 5 steps  steps/feet. Walking distance less than 1 block    Patient needs assistance with activities of daily living bathing, cooking and work. Extended Care / [facility]: No     Safety Issues: Difficulty with daily activities and Risk of falls  Patient is at risk for falls/fall history: Yes    General Exam:   Constitutional: Patient is adequately groomed with no evidence of malnutrition  DTRs: Deep tendon reflexes are intact  Mental Status: The patient is oriented to time, place and person. The patient's mood and affect are appropriate. Lymphatic: The lymphatic examination bilaterally reveals all areas to be without enlargement or induration. Vascular: Examination reveals no swelling or calf tenderness. Peripheral pulses are palpable and 2+. Neurological: The patient has good coordination. There is no weakness or sensory deficit. Left hip Examination:    Inspection:  No erythema or signs of infection. There are no cutaneous lesions. Palpation: No tenderness to palpation over the greater trochanteric region. Range of Motion:  Painful range of motion of the hip with reproducible groin pain. Strength:  Strength is limited secondary to both pain and range of motion. Special Tests: There is a positive log roll maneuver. Negative Homans test.    Skin: There are no rashes, ulcerations or lesions. Gait: Antalgic favoring the left side    Reflex 2+ patellar    Additional Comments:     Examination of the right hip reveals intact skin. The patient demonstrates full painless range of motion with regards to flexion, abduction, internal and external rotation. There is no tenderness about the greater trochanter. There is a negative straight leg raise against resistance. Strength is 5/5 throughout all planes. Additional Examinations:         Right Upper Extremity:  Examination of the right upper extremity does not show any tenderness, deformity or injury. Range of motion is unremarkable. There is no gross instability. There are no rashes, ulcerations or lesions. Strength and tone are normal.  Left Upper Extremity: Examination of the left upper extremity does not show any tenderness, deformity or injury. Range of motion is unremarkable. There is no gross instability. There are no rashes, ulcerations or lesions. Strength and tone are normal.    Radiology:     X-rays obtained and reviewed in office:  Views 2  Location left hip  Impression no fractures or malalignment identified. There is advanced, end-stage osteoarthritis of the hip with femoral acetabular joint space narrowing with subchondral cysts, sclerosis and osteophyte formation.       MRI left hip  FINDINGS:  Moderate-severity left hip osteoarthritis is present with small multifocal left hip    spurs, multiple subchondral cysts and subchondral bone marrow reaction within the superomedial    femoral head (which suggests the presence of subtle overlying chondromalacia) and multiple    small subchondral cysts within the posterior, anterior acetabular rim.         Osteoarthritis of the contralateral right hip is also present which is only included on the    coronal T1 and axial T2 fat-sat sequences which includes both hips is not well evaluated on    this left hip MRI examination.         Bone marrow signal alteration within the left (and right) superior femoral head is believed to    be entirely arthropathic in etiology related to the previously described left hip    osteoarthritis, no avascular necrosis is believed to be present (in this patient with clinical    concern for avascular necrosis). Gearline Allan is no evidence of fracture or stress fracture in the    left hip or remainder of the visualized pelvis.         Mild degenerative changes are present at the pubic symphysis.         A tear of the left anterosuperior acetabular labrum is present which is most likely    degenerative in nature with a dominant horizontal component (radial BASG water-excitation 3D    series 10 image 1 through 3 and axial BASG water-excitation series 7 image 43 through 49).      A small amount of capsular fluid is present in left hip joint without drainable left hip    effusion.  No drainable bursal fluid collection is present in the left hip to suggest bursitis.         Small low-grade partial-thickness interstitial tenoosseous separations/tears of the left (axial     T2 fat-sat series 6, images 13 and 16 and coronal PD fat-sat series 8 images 1 and 2) and also     the contralateral right (axial T2 fat-sat series 12, images 25 and 26) common hamstring    tendons at their origin on the ischial tuberosity are present.         The left gluteus medius, gluteus minimus, rectus femoris and iliopsoas tendons are intact with    no evidence of tendinosis or tendon tear.              There is no evidence of acute muscular strain and no substantial fatty infiltration or atrophy    of the musculature of the left hip.         The uterus is not visualized suggestive of prior hysterectomy.  Incidental note is made of    small subcentimeter short-axis nonenlarged bilateral inguinal lymph nodes.  No enlarged lymph    nodes are present in the left hip by size criteria.         CONCLUSION:    1. Moderate-severity left hip osteoarthritis is present with subchondral cysts and adjacent    subchondral bone marrow reaction within the superomedial humeral head (which suggests the    presence of subtle overlying chondromalacia, multiple small subchondral cysts within the left    posterior and anterior acetabular rim and small multifocal left hip spurs.  Osteoarthritis of    the contralateral right hip is also present which is not optimally evaluated on this left hip    MRI examination. The bone marrow signal alteration within the superior left femoral head is    believed to be entirely arthropathic in etiology, and no avascular necrosis is believed to be    present    2. Tear of the left anterosuperior acetabular labrum which is most likely degenerative in    nature. 3. Small low-grade partial-thickness interstitial tenoosseous separations/tears of the left    (and contralateral right) common hamstring tendon at their origin on the ischial tuberosity. 4. Additional findings and pertinent negatives as detailed above.         Thank you for the opportunity to provide your interpretation. Failed Outpatient management or Previous Surgical Intervention  Patient has undergone conservative treatment of left hip osteoarthritis for the past 12 months. Impression:  Encounter Diagnosis   Name Primary?  Primary osteoarthritis of left hip Yes       Office Procedures:  Orders Placed This Encounter   Procedures    [facility] PT [facility] Physical Therapy     Referral Priority:   Routine     Referral Type:   Eval and Treat     Referral Reason:   Specialty Services Required     Requested Specialty:   Physical Therapy     Number of Visits Requested:   1       Treatment Plan:  I discussed with the patient the nature of osteoarthritis of the hip. We talked about treatment of arthritis and the various options that are involved with this. The patient understands that the treatments can vary from essentially doing nothing to a total joint replacement arthroplasty for arthritis. I then went on to describe the utilization of glucosamine and chondroitin sulfate as a joint nutrition product. We talked about the fact that this is essentially a joint vitamin with typically minimal side effects. We also talked about utilization of prescription over-the-counter anti-inflammatory medications as the next option.   We talked about the typical course of this type of treatment and the fact that often times in the treatment for significant arthritis, this is successful less than half the time. We also talked about the corticosteroid injections and the fact that this can give a brief window of relief, but does not cure the problem; in fact, the pain often has a rebound effect in 6-10 weeks after the steroid has worn off. Lastly we discussed total joint replacement arthroplasty as the final and definitive step in treatment of arthritis. Patient realizes the magnitude of this type of treatment as well as having voiced a general understanding to the duration of the prosthesis. The patient voiced understanding to these continuum of treatment options. At this time the patient would like to go ahead and proceed with surgical intervention. We have recommended a left anterior total hip replacement with robotic assistance. The patient is aware that this will require a nondiagnostic CT scan for surgical planning with the insurance company may or may not provide coverage for. This would be an out-of-pocket expense that the patient would be financially responsible for. We discussed the risk, benefits and complications of total hip replacement arthroplasty surgery. We specifically discussed concerns including infection, deep vein thrombosis, leg length discrepancies, neurological injury, and specifically sciatic nerve injury with the possibility of footdrop or other neurological compromise. We further discussed the possibility of dislocation of the prosthesis and the precautions that are involved after total hip replacement surgery to try to avoid dislocation. We also discussed the reality of the rehabilitation process. We discussed the rehabilitation involved with total hip replacement surgery including home physical theray program as well as outpatient physical therapy.   We also talked about visiting with [facility] postoperative physical therapy to regain range of motion and strength after the surgery. All questions were answered. The appropriate literature was given to the patient. Demand matching completed:  Total hip category advanced

## 2020-09-28 NOTE — LETTER
performance of any surgical or medical procedure(s). I am aware that the practice of surgery and medicine is not an exact science, and I acknowledge that no guarantees have been made to me concerning the results of the procedure(s). 5) I consent to the taking of photographs before, during and after the procedure(s) for scientific and educational purposes. I also understand that medical students and residents may participate in the procedure(s) set forth in Paragraph 1, and I consent to their participation under the supervision of the above named physician. 6) I consent to the administration of anesthesia and to the use of such anesthetics as may be deemed advisable by the anesthesiologist engaged to administer anesthesia. 7) I certify that I have read and understand this consent to the surgical or medical procedure(s); that all the information contained herein was disclosed to me by the informing physician prior to my signing; that all blanks or statements requiring insertions or completion were filled in and inapplicable paragraphs, if any, were stricken before I signed; and that all questions asked by me about the procedure(s) have been fully answered by the informing physician in a satisfactory manner.    ________________________________                           _______________________________  Signature of patient                                                                  Clark Miller M.D.  ________________________________                           ________________________________  Signature of Informing Physician                                           Informing Physician (Print)    If patient is unable to sign or is a minor, complete one of the following:   A) Patient is a minor ______________ years of age.    B) Patient is unable to sign because_________________________________________________    The undersigned represents that he or she is duly authorized to execute to this consent for and on the behalf of the above named patient.    ________________________________             __________________________________________  Witness                                                                         Parent/Spouse/Guardian/Other:_________________    Medical Record#  Insurance  Smartphone:  Yes   Or   No  Email:                   You have signed a consent to have a total joint replacement surgery performed. Before you can proceed with surgery the following things must be done. Please use this form as a checklist.      _____   Please schedule your Physical Therapy functional evaluation. (Allowed locations are listed on the order)    _____   Please take your lab orders and get your blood work done at a [facility]. (If needed, please use the web site to find the location closest to you:  [email or web address]) You do not need an appointment and you do not need to fast.    _____  If you did not register in the office, you will need to go to the website for [employer] ([email or web address]) to complete registration and the medical questionnaire prior to your physical therapy evaluation. Do not schedule an appointment with your primary care physician until you have a surgery date. This pre-op history and physical has to be within 30 days of the surgery. _____  CT Scan. This will be scheduled once your surgery has been scheduled. _____  Information about the pre-op class, your CT scan, your post-op appointment and cold therapy options will be in your surgery packet that will be mailed to you after you are scheduled for surgery. Once you have completed both the labs and the Physical Therapy evaluation please call Carlos Manning @ [phone] to let her know.   Once verification of the PT Evaluation and completed labs has been determined you will be called and set up for surgery. This may take 1-2 days to check results and return your phone call. You will not be called about lab results if everything is normal.      Please make sure you have not blocked our number. Justo Quinteros will call from [phone] / [phone]. Also, please make sure your voice mail is not full.

## 2020-09-28 NOTE — PATIENT INSTRUCTIONS
Management discussed and patient voiced understanding. They were instructed to follow up with their PCP regarding any abnormal vitals reading.
Walk in

## 2020-10-01 DIAGNOSIS — M25.552 HIP PAIN, LEFT: Primary | ICD-10-CM

## 2020-10-06 ENCOUNTER — HOSPITAL ENCOUNTER (OUTPATIENT)
Dept: PHYSICAL THERAPY | Age: 65
Setting detail: THERAPIES SERIES
Discharge: HOME | End: 2020-10-06
Payer: MEDICARE

## 2020-10-06 PROCEDURE — 97110 THERAPEUTIC EXERCISES: CPT | Performed by: PHYSICAL THERAPIST

## 2020-10-06 PROCEDURE — 97161 PT EVAL LOW COMPLEX 20 MIN: CPT | Performed by: PHYSICAL THERAPIST

## 2020-10-06 NOTE — FLOWSHEET NOTE
[facility], [address] [place] [address]  Phone: [phone]  Fax [phone]    Physical Therapy Daily Treatment Note  Date:  10/6/2020    Patient Name:  Jeremi Willams    :  1955  MRN: [id number]  Restrictions/Precautions:    Medical/Treatment Diagnosis Information:  · Diagnosis: M16.12 (ICD-10-CM) - Primary osteoarthritis of left hip  · Treatment Diagnosis: M16.12 (ICD-10-CM) - Primary osteoarthritis of left hip  Insurance/Certification information:  PT Insurance Information: [employer]  - $40CP - $0DED - PT/OT MED NEC - 100% - NEEDS AUTH-  Physician Information:  Referring Practitioner: Dr. Fields Degree of care signed (Y/N):     Date of Patient follow up with Physician:     Progress Note: [x]  Yes  []  No  Next due by: Visit #10       Latex Allergy:  [x]NO      []YES  Preferred Language for Healthcare:   [x]English       []other:       Visit # Insurance Allowable Auth Required   In-person 1  [x]  Yes []  No    Telehealth   []  Yes []  No    Total        Pain level:  2-6/10     SUBJECTIVE:  See eval    OBJECTIVE: See eval   Observation:    Test measurements:      RESTRICTIONS/PRECAUTIONS: OA, anxiety,  Left knee needs replaced.      Exercises/Interventions:     Therapeutic Ex Sets/reps Notes        Seated HS stretch 3x30 sec HEP   Seated calf stretch 3x30 sec HEP   Seated QS BLEs 10 sec 10x HEP   Supine heelslide 1x10 HEP   LAQ  1x10 HEP   minisquats 1x10 HEP                  Manual Intervention                         NMR re-education                    Therapeutic Exercise and NMR EXR  [x] (74561) Provided verbal/tactile cueing for activities related to strengthening, flexibility, endurance, ROM for improvements in LE, proximal hip, and core control with self care, mobility, lifting, ambulation.  [] (60964) Provided verbal/tactile cueing for activities related to improving balance, coordination, kinesthetic sense, posture, motor skill, proprioception  to assist with LE, proximal hip, and core control in self care, mobility, lifting, ambulation and eccentric single leg control. NMR and Therapeutic Activities:    [] (18813 or 20088) Provided verbal/tactile cueing for activities related to improving balance, coordination, kinesthetic sense, posture, motor skill, proprioception and motor activation to allow for proper function of core, proximal hip and LE with self care and ADLs  [] (35085) Gait Re-education- Provided training and instruction to the patient for proper LE, core and proximal hip recruitment and positioning and eccentric body weight control with ambulation re-education including up and down stairs     Home Exercise Program:    [x] (35547) Reviewed/Progressed HEP activities related to strengthening, flexibility, endurance, ROM of core, proximal hip and LE for functional self-care, mobility, lifting and ambulation/stair navigation   [] (19449)Reviewed/Progressed HEP activities related to improving balance, coordination, kinesthetic sense, posture, motor skill, proprioception of core, proximal hip and LE for self care, mobility, lifting, and ambulation/stair navigation      Manual Treatments:  PROM / STM / Oscillations-Mobs:  G-I, II, III, IV (PA's, Inf., Post.)  [] (89102) Provided manual therapy to mobilize LE, proximal hip and/or LS spine soft tissue/joints for the purpose of modulating pain, promoting relaxation,  increasing ROM, reducing/eliminating soft tissue swelling/inflammation/restriction, improving soft tissue extensibility and allowing for proper ROM for normal function with self care, mobility, lifting and ambulation.      Modalities:      Charges:  Timed Code Treatment Minutes: 20   Total Treatment Minutes: 40     [x] EVAL (LOW) 51465 (typically 20 minutes face-to-face)  [] EVAL (MOD) 44170 (typically 30 minutes face-to-face)  [] EVAL (HIGH) 17182 (typically 45 minutes face-to-face)  [] RE-EVAL [x] RR(23030) x 1     [] IONTO  [] NMR (57523) x      [] VASO  [] Manual (32510) x       [] Other:  [] TA x       [] Mech Traction (18664)  [] ES(attended) (70543)      [] ES (un) (51544):     GOALS:   Patient stated goal: To be prepared for surgery      Therapist goals for Patient:    Short Term Goals: To be achieved in: 2 weeks  1. Independent in HEP and progression per patient tolerance, in order to prevent re-injury and to prepare for surgery by strength and flexibility therex . Progression Towards Functional goals:  [] Patient is progressing as expected towards functional goals listed. [] Progression is slowed due to complexities listed. [] Progression has been slowed due to co-morbidities. [x] Plan just implemented, too soon to assess goals progression  [] Other:     ASSESSMENT:  See eval    Treatment/Activity Tolerance:  [] Patient tolerated treatment well [] Patient limited by fatique  [x] Patient limited by pain  [] Patient limited by other medical complications  [] Other:     Prognosis: [] Good [] Fair  [] Poor        Recommend pt follow through with surgery date, recommend DC to home following sx.     Patient Requires Follow-up: [] Yes  [x] No    PLAN: See eval      [] Continue per plan of care [] Alter current plan (see comments)  [x] Plan of care initiated [] Discharge    Electronically signed by: Kyree Augustin PT

## 2020-10-06 NOTE — PLAN OF CARE
[facility], [address]  Phone: [phone]  Fax [phone]     Physical Therapy Certification    Dear Referring Practitioner: Dr. Aminta Naik,    We had the pleasure of evaluating the following patient for physical therapy services at [address] [place] [address]. A summary of our findings can be found in the initial assessment below. This includes our plan of care. If you have any questions or concerns regarding these findings, please do not hesitate to contact me at the office phone number checked above. Thank you for the referral.       Physician Signature:_______________________________Date:__________________  By signing above (or electronic signature), therapists plan is approved by physician    Patient: Charlena Canavan   : 1955   MRN: [id number]  Referring Physician: Referring Practitioner: Dr. Aminta Naik      Evaluation Date: 10/6/2020      Medical Diagnosis Information:  Diagnosis: M16.12 (ICD-10-CM) - Primary osteoarthritis of left hip   Treatment Diagnosis: M16.12 (ICD-10-CM) - Primary osteoarthritis of left hip                                         Insurance information: PT Insurance Information: [employer]  - $40CP - $0DED - PT/OT MED NEC - 100% - NEEDS AUTH-     Precautions/ Contra-indications: history of skin CA, OA  Left knee needs replaced too, anxiety/depression  Latex Allergy:  []NO      [x]YES - surgical gloves  Preferred Language for Healthcare:   [x]English       []other:    SUBJECTIVE: Patient stated complaint:Pt has had worsening left hip and knee pain. Both need replaced. She has pain with walking, transfers, and stairs. Pt is getting knee done first.  She lives in the basement of her home, and her daughter and daughter's family live with her. She has a step in tub.   Pt would like to spend the night in the hospital one night as she has had blood pressure issues with other surgeries. Relevant Medical History:left knee needs replaced. Functional Disability Index: LEFS  56/80 = 30%    Height: 5'5\" Weight: 145  Pain Scale: 2-6/10  Easing factors: rest  Provocative factors: sleeping, walking, standing, kneeling, sitting, stairs, lifting     Type: []Constant   []Intermittent  []Radiating []Localized []other:     Occupation/School:Desk job    Living Status/Prior Level of Function: Independent with ADLs and IADLs, walking, lift weights. OBJECTIVE:         [x] Patient history, allergies, meds reviewed. Medical chart reviewed. See intake form. Review Of Systems (ROS):  [x]Performed Review of systems (Integumentary, CardioPulmonary, Neurological) by intake and observation. Intake form has been scanned into medical record. Patient has been instructed to contact their primary care physician regarding ROS issues if not already being addressed at this time.       Co-morbidities/Complexities (which will affect course of rehabilitation):   []None           Arthritic conditions   []Rheumatoid arthritis (M05.9)  [x]Osteoarthritis (M19.91)   Cardiovascular conditions   []Hypertension (I10)  []Hyperlipidemia (E78.5)  []Angina pectoris (I20)  []Atherosclerosis (I70)   Musculoskeletal conditions   []Disc pathology   []Congenital spine pathologies   []Prior surgical intervention  []Osteoporosis (M81.8)  []Osteopenia (M85.8)   Endocrine conditions   []Hypothyroid (E03.9)  []Hyperthyroid Gastrointestinal conditions   []Constipation (D86.96)   Metabolic conditions   []Morbid obesity (E66.01)  []Diabetes type 1(E10.65) or 2 (E11.65)   []Neuropathy (G60.9)     Pulmonary conditions   []Asthma (J45)  []Coughing   []COPD (J44.9)   Psychological Disorders  [x]Anxiety (F41.9)  [x]Depression (F32.9)   []Other:   []Other:          Barriers to/and or personal factors that will affect rehab potential:              []Age  []Sex              []Motivation/Lack of Motivation [x]Co-Morbidities              []Cognitive Function, education/learning barriers              []Environmental, home barriers              []profession/work barriers  []past PT/medical experience  []other:  Justification: Left knee OA may inhibit full success of left Hip replacment. Falls Risk Assessment (30 days):   [x] Falls Risk assessed and no intervention required. [] Falls Risk assessed and Patient requires intervention due to being higher risk   TUG score (>12s at risk):     [] Falls education provided, including       G-Codes:    LEFS  56/80 = 30%    ASSESSMENT:   Functional Impairments:     []Noted lumbar/proximal hip/LE joint hypomobility   [x]Decreased LE functional ROM   [x]Decreased core/proximal hip strength and neuromuscular control   [x]Decreased LE functional strength   []Reduced balance/proprioceptive control   []other:      Functional Activity Limitations (from functional questionnaire and intake)   []Reduced ability to tolerate prolonged functional positions   [x]Reduced ability or difficulty with changes of positions or transfers between positions   [x]Reduced ability to maintain good posture and demonstrate good body mechanics with sitting, bending, and lifting   []Reduced ability to sleep   [] Reduced ability or tolerance with driving and/or computer work   [x]Reduced ability to perform lifting, carrying tasks   [x]Reduced ability to squat   [x]Reduced ability to forward bend   [x]Reduced ability to ambulate prolonged functional periods/distances/surfaces   [x]Reduced ability to ascend/descend stairs   []Reduced ability to run, hop, cut or jump   []other:    Participation Restrictions   []Reduced participation in self care activities   [x]Reduced participation in home management activities   []Reduced participation in work activities   []Reduced participation in social activities. [x]Reduced participation in sport/recreation activities.     Classification :    []Signs/symptoms consistent with post-surgical status including decreased ROM, strength and function. []Signs/symptoms consistent with joint sprain/strain   []Signs/symptoms consistent with patella-femoral syndrome   [x]Signs/symptoms consistent with knee OA/hip OA   []Signs/symptoms consistent with internal derangement of knee/Hip   []Signs/symptoms consistent with functional hip weakness/NMR control      []Signs/symptoms consistent with tendinitis/tendinosis    []signs/symptoms consistent with pathology which may benefit from Dry needling      []other:      Prognosis/Rehab Potential:      []Excellent   []Good    [x]Fair   []Poor    Tolerance of evaluation/treatment:    []Excellent   []Good    [x]Fair   []Poor  Physical Therapy Evaluation Complexity Justification  [x] A history of present problem with:  [] no personal factors and/or comorbidities that impact the plan of care;  [x]1-2 personal factors and/or comorbidities that impact the plan of care  []3 personal factors and/or comorbidities that impact the plan of care  [x] An examination of body systems using standardized tests and measures addressing any of the following: body structures and functions (impairments), activity limitations, and/or participation restrictions;:  [] a total of 1-2 or more elements   [x] a total of 3 or more elements   [] a total of 4 or more elements   [x] A clinical presentation with:  [x] stable and/or uncomplicated characteristics   [] evolving clinical presentation with changing characteristics  [] unstable and unpredictable characteristics;   [x] Clinical decision making of [x] low, [] moderate, [] high complexity using standardized patient assessment instrument and/or measurable assessment of functional outcome.     [x] EVAL (LOW) 76224 (typically 20 minutes face-to-face)  [] EVAL (MOD) 37260 (typically 30 minutes face-to-face)  [] EVAL (HIGH) 96837 (typically 45 minutes face-to-face)  [] RE-EVAL       PLAN:   Frequency/Duration:  1 prehab visit. Interventions:  [x]  Therapeutic exercise including: strength training, ROM, for Lower extremity and core   []  NMR activation and proprioception for LE, Glutes and Core   []  Manual therapy as indicated for LE, Hip and spine to include: Dry Needling/IASTM, STM, PROM, Gr I-IV mobilizations, manipulation. [] Modalities as needed that may include: thermal agents, E-stim, Biofeedback, US, iontophoresis as indicated  [x] Patient education on joint protection, postural re-education, activity modification, progression of HEP. [x] Patient appears to be functionally prepared for surgery and will continue with a home exercise program until surgery date. [] Patient will be ready for surgery with a short period of structured physical therapy  [] Patient does not appear to be functionally ready for surgery and requires a prolonged  structure program    At this point, it appears patient's post-operative discharge status from hospital should be:   [x] Home with home exercise program and outpatient PT  [] Home with home health care and   [] Skilled nursing facility/ Inpatient Rehab    HEP instruction: (see scanned forms)    GOALS:  Patient stated goal: Strength    Therapist goals for Patient:   Short Term Goals: To be achieved in: 1  weeks  1. Independent in HEP and progression per patient tolerance, in order to prevent re-injury. [] Progressing: [] Met: [] Not Met: [] Adjusted  2. Patient will have a decrease in pain to facilitate improvement in movement, function, and ADLs as indicated by Functional Deficits.   [] Progressing: [] Met: [] Not Met: [] Adjusted      Electronically signed by:  Bushra Paris PT

## 2020-10-12 LAB
ALBUMIN SERPL-MCNC: 4.5 G/DL (ref 3.4–5)
ANION GAP SERPL CALCULATED.3IONS-SCNC: 11 MMOL/L (ref 3–16)
APTT: 29.9 SEC (ref 24.2–36.2)
BILIRUBIN URINE: NEGATIVE
BLOOD, URINE: NEGATIVE
BUN BLDV-MCNC: 12 MG/DL (ref 7–20)
CALCIUM SERPL-MCNC: 9.5 MG/DL (ref 8.3–10.6)
CHLORIDE BLD-SCNC: 99 MMOL/L (ref 99–110)
CLARITY: ABNORMAL
CO2: 31 MMOL/L (ref 21–32)
COLOR: YELLOW
CREAT SERPL-MCNC: <0.5 MG/DL (ref 0.6–1.2)
EPITHELIAL CELLS, UA: 2 /HPF (ref 0–5)
GFR AFRICAN AMERICAN: >60
GFR NON-AFRICAN AMERICAN: >60
GLUCOSE BLD-MCNC: 88 MG/DL (ref 70–99)
GLUCOSE URINE: NEGATIVE MG/DL
HCT VFR BLD CALC: 37.9 % (ref 36–48)
HEMOGLOBIN: 13 G/DL (ref 12–16)
HYALINE CASTS: 1 /LPF (ref 0–8)
INR BLD: 1.04 (ref 0.86–1.14)
KETONES, URINE: NEGATIVE MG/DL
LEUKOCYTE ESTERASE, URINE: NEGATIVE
MCH RBC QN AUTO: 33 PG (ref 26–34)
MCHC RBC AUTO-ENTMCNC: 34.4 G/DL (ref 31–36)
MCV RBC AUTO: 96 FL (ref 80–100)
MICROSCOPIC EXAMINATION: YES
NITRITE, URINE: NEGATIVE
PDW BLD-RTO: 12.1 % (ref 12.4–15.4)
PH UA: 7.5 (ref 5–8)
PLATELET # BLD: 230 K/UL (ref 135–450)
PMV BLD AUTO: 9.1 FL (ref 5–10.5)
POTASSIUM SERPL-SCNC: 3.4 MMOL/L (ref 3.5–5.1)
PROTEIN UA: NEGATIVE MG/DL
PROTHROMBIN TIME: 12.1 SEC (ref 10–13.2)
RBC # BLD: 3.94 M/UL (ref 4–5.2)
RBC UA: NORMAL /HPF (ref 0–4)
SODIUM BLD-SCNC: 141 MMOL/L (ref 136–145)
SPECIFIC GRAVITY UA: 1.02 (ref 1–1.03)
TRANSFERRIN: 257 MG/DL (ref 200–360)
URINE TYPE: ABNORMAL
UROBILINOGEN, URINE: 0.2 E.U./DL
WBC # BLD: 3.9 K/UL (ref 4–11)
WBC UA: 1 /HPF (ref 0–5)

## 2020-10-13 LAB
ESTIMATED AVERAGE GLUCOSE: 119.8 MG/DL
HBA1C MFR BLD: 5.8 %
ORGANISM: ABNORMAL
ORGANISM: ABNORMAL
URINE CULTURE, ROUTINE: ABNORMAL
URINE CULTURE, ROUTINE: ABNORMAL

## 2020-10-19 ENCOUNTER — TELEPHONE (OUTPATIENT)
Dept: ORTHOPEDIC SURGERY | Age: 65
End: 2020-10-19

## 2020-11-02 ENCOUNTER — TELEPHONE (OUTPATIENT)
Dept: ORTHOPEDIC SURGERY | Age: 65
End: 2020-11-02

## 2020-11-02 RX ORDER — TRAMADOL HYDROCHLORIDE 50 MG/1
50 TABLET ORAL EVERY 6 HOURS PRN
Qty: 28 TABLET | Refills: 0 | Status: SHIPPED | OUTPATIENT
Start: 2020-11-02 | End: 2020-11-09

## 2020-11-02 NOTE — TELEPHONE ENCOUNTER
Patient scheduled for a THR on 11/9. Wanted to know if she could get something for pain to get her thru until surgery?     Ernst Ortiz

## 2020-11-04 LAB — SARS-COV-2: NOT DETECTED

## 2020-11-09 LAB
ABO GROUPING: NORMAL
ANTIBODY SCREEN: NEGATIVE
HCT VFR BLD CALC: 29.7 % (ref 35–45)
HEMOGLOBIN: 10.2 G/DL (ref 11.7–15.5)
MCH RBC QN AUTO: 33.3 PG (ref 27–33)
MCHC RBC AUTO-ENTMCNC: 34.4 G/DL (ref 32–36)
MCV RBC AUTO: 96.7 FL (ref 80–100)
PDW BLD-RTO: 11.9 % (ref 11–15)
PLATELET # BLD: 173 10*3/UL (ref 140–400)
PMV BLD AUTO: 8.6 FL (ref 7.5–11.5)
RBC # BLD: 3.07 10*6/UL (ref 3.8–5.1)
RH FACTOR: POSITIVE
WBC # BLD: 10.2 10*3/UL (ref 3.8–10.8)

## 2020-11-11 ENCOUNTER — TELEPHONE (OUTPATIENT)
Dept: ORTHOPEDIC SURGERY | Age: 65
End: 2020-11-11

## 2020-11-11 NOTE — TELEPHONE ENCOUNTER
I spoke to the patient today. Sarah Leger had actually also spoken to her earlier today as well.   She will follow-up with her virtual visit next week

## 2020-11-19 ENCOUNTER — TELEPHONE (OUTPATIENT)
Dept: ORTHOPEDIC SURGERY | Age: 65
End: 2020-11-19

## 2020-11-19 RX ORDER — OXYCODONE HYDROCHLORIDE AND ACETAMINOPHEN 5; 325 MG/1; MG/1
1 TABLET ORAL EVERY 6 HOURS PRN
Qty: 28 TABLET | Refills: 0 | Status: SHIPPED | OUTPATIENT
Start: 2020-11-19 | End: 2020-11-26

## 2020-11-23 ENCOUNTER — VIRTUAL VISIT (OUTPATIENT)
Dept: ORTHOPEDIC SURGERY | Age: 65
End: 2020-11-23

## 2020-11-23 PROCEDURE — 99024 POSTOP FOLLOW-UP VISIT: CPT | Performed by: PHYSICIAN ASSISTANT

## 2020-11-23 NOTE — PROGRESS NOTES
Evert Umanzor is a 75 y.o. female being evaluated by a Virtual Visit (video visit) encounter to address concerns as mentioned above. A caregiver was present when appropriate. Due to this being a TeleHealth encounter ([facility] public health emergency), evaluation of the following organ systems was limited: Vitals/Constitutional/EENT/Resp/CV/GI//MS/Neuro/Skin/Heme-Lymph-Imm. Pursuant to the emergency declaration under the [address] [place] [address] and the [employer] Act, this Virtual Visit was conducted with patient's (and/or legal guardian's) consent, to reduce the patient's risk of exposure to COVID-19 and provide necessary medical care. The patient (and/or legal guardian) has also been advised to contact this office for worsening conditions or problems, and seek emergency medical treatment and/or call 911 if deemed necessary. Services were provided through a video synchronous discussion virtually to substitute for in-person clinic visit. Patient's location was at their home. --Malena Peters PA-C on 11/23/2020 at 4:10 PM    An electronic signature was used to authenticate this note. History of present illness: The patient returns today after left and total hip replacement. Pain control as been satisfactory with oral medications. There have been no fevers or chills. Physical examination: The incision is clean, dry, and intact with no drainage or signs of infection. There is expected swelling with no evidence of DVT and a negative Homans sign. Neurovascular exam is intact. Limb length feels appropriate the patient      Assessment/plan: We would like to begin physical therapy to restore range of motion and strength, at 4 weeks postop. The patient was given local wound care instructions. We did not refilled their pain medication today.  He will followup in 1 weeks for reevaluation for suture removal.

## 2020-11-24 ENCOUNTER — TELEPHONE (OUTPATIENT)
Dept: ORTHOPEDIC SURGERY | Age: 65
End: 2020-11-24

## 2020-12-01 ENCOUNTER — OFFICE VISIT (OUTPATIENT)
Dept: ORTHOPEDIC SURGERY | Age: 65
End: 2020-12-01

## 2020-12-01 PROCEDURE — 99024 POSTOP FOLLOW-UP VISIT: CPT | Performed by: PHYSICIAN ASSISTANT

## 2020-12-01 NOTE — LETTER
[address] [place] [address] Dr Ernestina Mor[place] [address]  Phone: [phone]  Fax: [phone] [place], [place]        December 1, 2020     Patient: Benton An   YOB: 1955   Date of Visit: 12/1/2020       To Whom It May Concern: It is my medical opinion that Justina Carranza was off work from 11/9/20 through 11/24/20. She is able to work remotely from 11/25/20-12/20/20. On 12/21/20, she may return to work and must use a cane. If you have any questions or concerns, please don't hesitate to call.     Sincerely,    Danette Guajardo PA-C

## 2020-12-07 ENCOUNTER — HOSPITAL ENCOUNTER (OUTPATIENT)
Dept: PHYSICAL THERAPY | Age: 65
Setting detail: THERAPIES SERIES
Discharge: HOME | End: 2020-12-07
Payer: MEDICARE

## 2020-12-07 PROCEDURE — 97110 THERAPEUTIC EXERCISES: CPT

## 2020-12-07 PROCEDURE — 97161 PT EVAL LOW COMPLEX 20 MIN: CPT

## 2020-12-07 PROCEDURE — 97112 NEUROMUSCULAR REEDUCATION: CPT

## 2020-12-07 NOTE — FLOWSHEET NOTE
[facility], [address] [place] [address] [address]  Phone: [phone]   Fax:     [phone]      Physical Therapy Treatment Note/ Progress Report:     Date:  2020    Patient Name:  Carlotta Martinez    :  1955  MRN: [id number]  Restrictions/Precautions:    Medical/Treatment Diagnosis Information:  · Diagnosis: Status post total hip replacement, left 2/2 L hip OA M16.12  · Treatment Diagnosis: Left hip pain N07.431  Insurance/Certification information:  PT Insurance Information: [employer]  Physician Information:  Referring Practitioner: Dr. Meg Sanchez  Has the plan of care been signed (Y/N):        []  Yes  [x]  No     Date of Patient follow up with Physician: 2020      Is this a Progress Report:     []  Yes  [x]  No        If Yes:  Date Range for reporting period:  Beginning  Ending    Progress report will be due (10 Rx or 30 days whichever is less): 8/3/3086      Recertification will be due (POC Duration  / 90 days whichever is less): 2021       Visit # Insurance Allowable Auth Required   1 Needs Auth []  Yes []  No        Functional Scale: LEFS 58%   Date assessed:  2020     Latex Allergy:  [x]NO      []YES  Preferred Language for Healthcare:   [x]English       []other:      Pain level:  1-5/10     SUBJECTIVE:  See eval    OBJECTIVE: See eval   Observation:    Test measurements:      RESTRICTIONS/PRECAUTIONS: s/p anterior approach L AALIYAH 2020 8 wk OV 2021   history of skin CA, OA  Left knee needs replaced too, anxiety/depression    Exercises/Interventions:   Therapeutic Ex (72151) Sets/sec Reps Notes/CUES HEP   Supine TA 5\" 10 cues    Supine TA heel slide  10 cues    Supine ball sqz 5\" 10 cues    Supine clam limited ROM  10 Silver, cues    Supine TA march  3 cues    Quad sets 10\" 10 cues    HR's  10 cues                                Pt education 10'  Reviewed precautions, HEP with gradual progression, goals of PT, not to push through pain with ex, use of ice- pt stated understanding    Manual Intervention ([id number])                                                 NMR re-education (77782)   CUES NEEDED                                                                   Therapeutic Activity (30810)                                                     Therapeutic Exercise and NMR EXR  [x] (63030) Provided verbal/tactile cueing for activities related to strengthening, flexibility, endurance, ROM for improvements in LE, proximal hip, and core control with self care, mobility, lifting, ambulation. [x] (11572) Provided verbal/tactile cueing for activities related to improving balance, coordination, kinesthetic sense, posture, motor skill, proprioception to assist with LE, proximal hip, and core control in self-care, mobility, lifting, ambulation and eccentric single leg control.      NMR and Therapeutic Activities:    [x] (08612 or 77652) Provided verbal/tactile cueing for activities related to improving balance, coordination, kinesthetic sense, posture, motor skill, proprioception and motor activation to allow for proper function of core, proximal hip and LE with self-care and ADLs and functional mobility.   [] (44672) Gait Re-education- Provided training and instruction to the patient for proper LE, core and proximal hip recruitment and positioning and eccentric body weight control with ambulation re-education including up and down stairs     Home Exercise Program:    [x] (90851) Reviewed/Progressed HEP activities related to strengthening, flexibility, endurance, ROM of core, proximal hip and LE for functional self-care, mobility, lifting and ambulation/stair navigation   [] (69423) Reviewed/Progressed HEP activities related to improving balance, coordination, kinesthetic sense, posture, motor skill, proprioception of core, proximal hip and LE for self-care, mobility, lifting, and ambulation/stair navigation      Manual Treatments:  PROM / STM / Oscillations-Mobs:  G-I, II, III, IV (PA's, Inf., Post.)  [] (68042) Provided manual therapy to mobilize LE, proximal hip and/or LS spine soft tissue/joints for the purpose of modulating pain, promoting relaxation, increasing ROM, reducing/eliminating soft tissue swelling/inflammation/restriction, improving soft tissue extensibility and allowing for proper ROM for normal function with self-care, mobility, lifting and ambulation. Modalities:  CP 10'   [] GAME READY (VASO)- for significant edema, swelling, pain control. Charges:  Timed Code Treatment Minutes: 25   Total Treatment Minutes: 42      [x] EVAL (LOW) 22653 (typically 20 minutes face-to-face)  [] EVAL (MOD) 49890 (typically 30 minutes face-to-face)  [] EVAL (HIGH) 96416 (typically 45 minutes face-to-face)  [] RE-EVAL     [x] ZI(63320) x     [] IONTO  [x] NMR (50644) x     [] VASO  [] Manual (62038) x     [] Other:  [] TA x      [] Mech Traction (63790)  [] ES(attended) (90883)      [] ES (un) (42705):    ASSESSMENT:  See eval    GOALS:     Patient stated goal: Walking, lift weights, elliptical.     Therapist goals for Patient:   Short Term Goals: To be achieved in: 2 weeks  1. Independent in HEP and progression per patient tolerance, in order to prevent re-injury. [] Progressing: [] Met: [] Not Met: [] Adjusted     2. Patient will have a decrease in pain to facilitate improvement in movement, function, and ADLs as indicated by Functional Deficits. [] Progressing: [] Met: [] Not Met: [] Adjusted     Long Term Goals: To be achieved in: 12 weeks  1. Disability index score of 27% or less for the LEFS to assist with reaching prior level of function. [] Progressing: [] Met: [] Not Met: [] Adjusted     2. Patient will demonstrate increased AROM to  100 hip flex to allow for proper joint functioning as indicated by patients Functional Deficits. [] Progressing: [] Met: [] Not Met: [] Adjusted     3.  Patient will demonstrate an If patient does not return for scheduled/ recommended follow up visits, this note will serve as a discharge from care along with most recent update on progress.

## 2020-12-07 NOTE — PLAN OF CARE
[facility], [address] [facility] Cecily Bates, [address] [facility] [address]  Phone: [phone]   Fax:     [phone]       Physical Therapy Certification    Dear Referring Practitioner: Dr. Manolo He,    We had the pleasure of evaluating the following patient for physical therapy services at [address] [place] [address]. A summary of our findings can be found in the initial assessment below. This includes our plan of care. If you have any questions or concerns regarding these findings, please do not hesitate to contact me at the office phone number checked above. Thank you for the referral.       Physician Signature:_______________________________Date:__________________  By signing above (or electronic signature), therapists plan is approved by physician      Patient: Rajinder Atkins   : 1955   MRN: [id number]  Referring Physician: Referring Practitioner: Dr. Manolo He      Evaluation Date: 2020      Medical Diagnosis Information:  Diagnosis: Status post total hip replacement, left 2/2 L hip OA M16.12   Treatment Diagnosis: Left hip pain M25.552                                         Insurance information: PT Insurance Information: [place]     Precautions/ Contra-indications:  history of skin CA, OA  Left knee needs replaced too, anxiety/depression      C-SSRS Triggered by Intake questionnaire (Past 2 wk assessment):   [x] No, Questionnaire did not trigger screening.   [] Yes, Patient intake triggered further evaluation      [] C-SSRS Screening completed  [] PCP notified via Plan of Care  [] Emergency services notified     Latex Allergy:  [x]NO      []YES  Preferred Language for Healthcare:   [x]English       []other:    SUBJECTIVE: Patient stated complaint: Prehab eval 10/6/2020. Had sx 2020. Pt 4 wks PO. Reports some soreness has been walking with cane.  Did a lot of walking []Rheumatoid arthritis (M05.9)  [x]Osteoarthritis (M19.91)   Cardiovascular conditions   []Hypertension (I10)  []Hyperlipidemia (E78.5)  []Angina pectoris (I20)  []Atherosclerosis (I70)   Musculoskeletal conditions   []Disc pathology   []Congenital spine pathologies   []Prior surgical intervention  []Osteoporosis (M81.8)  []Osteopenia (M85.8)   Endocrine conditions   []Hypothyroid (E03.9)  []Hyperthyroid Gastrointestinal conditions   []Constipation (F02.16)   Metabolic conditions   []Morbid obesity (E66.01)  []Diabetes type 1(E10.65) or 2 (E11.65)   []Neuropathy (G60.9)     Pulmonary conditions   []Asthma (J45)  []Coughing   []COPD (J44.9)   Psychological Disorders  [x]Anxiety (F41.9)  [x]Depression (F32.9)   []Other:   [x]Other:     history of skin CA, OA  Left knee needs replaced too, anxiety/depression     Barriers to/and or personal factors that will affect rehab potential:              []Age  []Sex              []Motivation/Lack of Motivation                        [x]Co-Morbidities              []Cognitive Function, education/learning barriers              []Environmental, home barriers              []profession/work barriers  []past PT/medical experience  []other:  Justification:  Left knee OA may limit full progression of left Hip replacment. Falls Risk Assessment (30 days):    [x] Falls Risk assessed and no intervention required.   [] Falls Risk assessed and Patient requires intervention due to being higher risk   TUG score (>12s at risk):     [] Falls education provided, including       G-Codes:       ASSESSMENT:    Functional Impairments:     []Noted lumbar/proximal hip/LE joint hypomobility   [x]Decreased LE functional ROM   [x]Decreased core/proximal hip strength and neuromuscular control   [x]Decreased LE functional strength   []Reduced balance/proprioceptive control   []other:      Functional Activity Limitations (from functional questionnaire and intake)   [x]Reduced ability to tolerate prolonged functional positions   [x]Reduced ability or difficulty with changes of positions or transfers between positions   [x]Reduced ability to maintain good posture and demonstrate good body mechanics with sitting, bending, and lifting   [x]Reduced ability to sleep   [x] Reduced ability or tolerance with driving and/or computer work   [x]Reduced ability to perform lifting, carrying tasks   [x]Reduced ability to squat   [x]Reduced ability to forward bend   [x]Reduced ability to ambulate prolonged functional periods/distances/surfaces   [x]Reduced ability to ascend/descend stairs   [x]Reduced ability to run, hop, cut or jump   []other:    Participation Restrictions   [x]Reduced participation in self care activities   [x]Reduced participation in home management activities   []Reduced participation in work activities   []Reduced participation in social activities. []Reduced participation in sport/recreation activities. Classification :    [x]Signs/symptoms consistent with post-surgical status including decreased ROM, strength and function.    []Signs/symptoms consistent with joint sprain/strain   []Signs/symptoms consistent with patella-femoral syndrome   []Signs/symptoms consistent with knee OA/hip OA   []Signs/symptoms consistent with internal derangement of knee/Hip   []Signs/symptoms consistent with functional hip weakness/NMR control      []Signs/symptoms consistent with tendinitis/tendinosis    []signs/symptoms consistent with pathology which may benefit from Dry needling      []other:      Prognosis/Rehab Potential:      []Excellent   [x]Good    []Fair   []Poor    Tolerance of evaluation/treatment:    []Excellent   [x]Good    []Fair   []Poor    Physical Therapy Evaluation Complexity Justification  [x] A history of present problem with:  [] no personal factors and/or comorbidities that impact the plan of care;  [x]1-2 personal factors and/or comorbidities that impact the plan of care  []3 personal factors and/or comorbidities that impact the plan of care  [x] An examination of body systems using standardized tests and measures addressing any of the following: body structures and functions (impairments), activity limitations, and/or participation restrictions;:  [] a total of 1-2 or more elements   [] a total of 3 or more elements   [x] a total of 4 or more elements   [x] A clinical presentation with:  [x] stable and/or uncomplicated characteristics   [] evolving clinical presentation with changing characteristics  [] unstable and unpredictable characteristics;   [x] Clinical decision making of [x] low, [] moderate, [] high complexity using standardized patient assessment instrument and/or measurable assessment of functional outcome. [x] EVAL (LOW) 17347 (typically 20 minutes face-to-face)  [] EVAL (MOD) 96439 (typically 30 minutes face-to-face)  [] EVAL (HIGH) 04631 (typically 45 minutes face-to-face)  [] RE-EVAL     PLAN:   Frequency/Duration:  1-2 days per week for 12 Weeks:  Interventions:  [x]  Therapeutic exercise including: strength training, ROM, for Lower extremity and core   [x]  NMR activation and proprioception for LE, Glutes and Core   [x]  Manual therapy as indicated for LE, Hip and spine to include: Dry Needling/IASTM, STM, PROM, Gr I-IV mobilizations, manipulation. [x] Modalities as needed that may include: thermal agents, E-stim, Biofeedback, US, iontophoresis as indicated  [x] Patient education on joint protection, postural re-education, activity modification, progression of HEP. HEP instruction:  (see scanned forms)    GOALS:  Patient stated goal: Walking, lift weights, elliptical.     Therapist goals for Patient:   Short Term Goals: To be achieved in: 2 weeks  1. Independent in HEP and progression per patient tolerance, in order to prevent re-injury. [] Progressing: [] Met: [] Not Met: [] Adjusted     2.  Patient will have a decrease in pain to facilitate improvement in movement, function, and ADLs as indicated by Functional Deficits. [] Progressing: [] Met: [] Not Met: [] Adjusted     Long Term Goals: To be achieved in: 12 weeks  1. Disability index score of 27% or less for the LEFS to assist with reaching prior level of function. [] Progressing: [] Met: [] Not Met: [] Adjusted     2. Patient will demonstrate increased AROM to  100 hip flex to allow for proper joint functioning as indicated by patients Functional Deficits. [] Progressing: [] Met: [] Not Met: [] Adjusted     3. Patient will demonstrate an increase in Strength to good proximal hip strength and control, within 5lb HHD in LE to allow for proper functional mobility as indicated by patients Functional Deficits. [] Progressing: [] Met: [] Not Met: [] Adjusted     4. Patient will return to functional activities without increased symptoms or restriction. [] Progressing: [] Met: [] Not Met: [] Adjusted     5.  Patient will return to walking 20-30 mins I no AD without increased symptoms or restriction. (patient specific functional goal)    [] Progressing: [] Met: [] Not Met: [] Adjusted      Electronically signed by:  Jeyson Hay PT

## 2020-12-14 ENCOUNTER — HOSPITAL ENCOUNTER (OUTPATIENT)
Dept: PHYSICAL THERAPY | Age: 65
Setting detail: THERAPIES SERIES
Discharge: HOME | End: 2020-12-14
Payer: MEDICARE

## 2020-12-14 PROCEDURE — 97110 THERAPEUTIC EXERCISES: CPT

## 2020-12-14 PROCEDURE — 97112 NEUROMUSCULAR REEDUCATION: CPT

## 2020-12-14 NOTE — FLOWSHEET NOTE
esperanza Guevara , [address], [address] [place] [address] [address]  Phone: [phone]   Fax:     [phone]      Physical Therapy Treatment Note/ Progress Report:     Date:  2020    Patient Name:  Bryce Rangel    :  1955  MRN: [id number]  Restrictions/Precautions:    Medical/Treatment Diagnosis Information:  · Diagnosis: Status post total hip replacement, left 2/2 L hip OA M16.12  · Treatment Diagnosis: Left hip pain X54.207  Insurance/Certification information:  PT Insurance Information: [place]  Physician Information:  Referring Practitioner: Dr. Mauricio Beach  Has the plan of care been signed (Y/N):        []  Yes  [x]  No     Date of Patient follow up with Physician: 2020      Is this a Progress Report:     []  Yes  [x]  No        If Yes:  Date Range for reporting period:  Beginning  Ending    Progress report will be due (10 Rx or 30 days whichever is less): 0/3/9364      Recertification will be due (POC Duration  / 90 days whichever is less): 2021       Visit # Insurance Allowable Auth Required   2 Needs Auth []  Yes []  No        Functional Scale: LEFS 58%   Date assessed:  2020     Latex Allergy:  [x]NO      []YES  Preferred Language for Healthcare:   [x]English       []other:      Pain level:  1-5/10     SUBJECTIVE:  Pt 5 wks PO. Reports feels tight front of hip, thinks she did to much yesterday had some people over and was up walking around cleaning. Stated felt sore after, stated did not ice took half pain pill and it took the edge off.        OBJECTIVE: See eval  ? Observation:   ? Test measurements:  78 deg hip flex AROM    RESTRICTIONS/PRECAUTIONS: s/p anterior approach L AALIYAH 2020 8 wk OV 2021   history of skin CA, OA  Left knee needs replaced too, anxiety/depression    Exercises/Interventions:   Therapeutic Ex (03971) Sets/sec Reps Notes/CUES HEP   Bike ROM  Supine TA NV  5\"   30 Not to increas symptoms cues    Supine TA heel slide  30 cues    Supine ball sqz 5\" 30 cues    Supine clam limited ROM  10 Silver, cues    Supine TA march 18 cues    Quad sets 10\" 10 cues    HR's  20 cues    Standing hip ABD 3 10 Limited ROM, cues    LAQ  HS curl  Step ups 2#    2\" 30  30  10   Cues  Cues  Cues, not to overdo/increas symptoms                  Pt education 10'  Reviewed precautions, HEP with gradual progression, goals of PT, not to push through pain with ex, use of ice- pt stated understanding    Manual Intervention (45480)                                                 NMR re-education (88559)   CUES NEEDED                                                                   Therapeutic Activity (05849)                                                     Therapeutic Exercise and NMR EXR  [x] (64803) Provided verbal/tactile cueing for activities related to strengthening, flexibility, endurance, ROM for improvements in LE, proximal hip, and core control with self care, mobility, lifting, ambulation. [x] (49254) Provided verbal/tactile cueing for activities related to improving balance, coordination, kinesthetic sense, posture, motor skill, proprioception to assist with LE, proximal hip, and core control in self-care, mobility, lifting, ambulation and eccentric single leg control.      NMR and Therapeutic Activities:    [x] (01739 or 94842) Provided verbal/tactile cueing for activities related to improving balance, coordination, kinesthetic sense, posture, motor skill, proprioception and motor activation to allow for proper function of core, proximal hip and LE with self-care and ADLs and functional mobility.   [] (39060) Gait Re-education- Provided training and instruction to the patient for proper LE, core and proximal hip recruitment and positioning and eccentric body weight control with ambulation re-education including up and down stairs     Home Exercise Program: 1. Independent in HEP and progression per patient tolerance, in order to prevent re-injury. [] Progressing: [] Met: [] Not Met: [] Adjusted     2. Patient will have a decrease in pain to facilitate improvement in movement, function, and ADLs as indicated by Functional Deficits. [] Progressing: [] Met: [] Not Met: [] Adjusted     Long Term Goals: To be achieved in: 12 weeks  1. Disability index score of 27% or less for the LEFS to assist with reaching prior level of function. [] Progressing: [] Met: [] Not Met: [] Adjusted     2. Patient will demonstrate increased AROM to  100 hip flex to allow for proper joint functioning as indicated by patients Functional Deficits. [] Progressing: [] Met: [] Not Met: [] Adjusted     3. Patient will demonstrate an increase in Strength to good proximal hip strength and control, within 5lb HHD in LE to allow for proper functional mobility as indicated by patients Functional Deficits. [] Progressing: [] Met: [] Not Met: [] Adjusted     4. Patient will return to functional activities without increased symptoms or restriction. [] Progressing: [] Met: [] Not Met: [] Adjusted     5. Patient will return to walking 20-30 mins I no AD without increased symptoms or restriction. (patient specific functional goal)    [] Progressing: [] Met: [] Not Met: [] Adjusted         Overall Progression Towards Functional goals/ Treatment Progress Update:  [] Patient is progressing as expected towards functional goals listed. [] Progression is slowed due to complexities/Impairments listed. [] Progression has been slowed due to co-morbidities.   [x] Plan just implemented, too soon to assess goals progression <30days   [] Goals require adjustment due to lack of progress  [] Patient is not progressing as expected and requires additional follow up with physician  [] Other    Prognosis for POC: [x] Good [] Fair  [] Poor      Patient requires continued skilled intervention: [x] Yes  [] No Treatment/Activity Tolerance:  [x] Patient able to complete treatment  [] Patient limited by fatigue  [] Patient limited by pain    [] Patient limited by other medical complications  [] Other:     Return to Play: (if applicable)   []  Stage 1: Intro to Strength   []  Stage 2: Return to Run and Strength   []  Stage 3: Return to Jump and Strength   []  Stage 4: Dynamic Strength and Agility   []  Stage 5: Sport Specific Training     []  Ready to Return to Play, Meets All Above Stages   []  Not Ready for Return to Sports   Comments:                          PLAN: See eval  [x] Continue per plan of care [] Alter current plan (see comments above)  [] Plan of care initiated [] Hold pending MD visit [] Discharge    Electronically signed by:  Lyn Dela Cruz PT    Note: If patient does not return for scheduled/ recommended follow up visits, this note will serve as a discharge from care along with most recent update on progress.

## 2020-12-17 ENCOUNTER — OFFICE VISIT (OUTPATIENT)
Dept: ORTHOPEDIC SURGERY | Age: 65
End: 2020-12-17

## 2020-12-17 VITALS — HEIGHT: 65 IN | BODY MASS INDEX: 24.16 KG/M2 | WEIGHT: 145 LBS

## 2020-12-17 PROCEDURE — 99024 POSTOP FOLLOW-UP VISIT: CPT | Performed by: ORTHOPAEDIC SURGERY

## 2020-12-17 NOTE — PROGRESS NOTES
Patient is approximately 6 weeks status post left anterior total hip replacement. Overall she is doing extremely well. Her pain is well controlled. She has some stiffness but overall is ambulating without the use of ambulatory aids and is in physical therapy. She does report some left knee pain which she has a known history and diagnosis of osteoarthritis. Pain Assessment  Location of Pain: Pelvis  Location Modifiers: Left  Severity of Pain: 2  Quality of Pain: Aching, Dull, Throbbing  Frequency of Pain: Intermittent  Result of Injury: No  Work-Related Injury: No  Are there other pain locations you wish to document?: No]    On physical exam, the patient's incision looks excellent, is no signs of infection or DVT. Neurovascular exam is intact. Leg lengths are equal.      As time, I recommend continued range of motion, strengthening exercises with outpatient as well therapy. We will see her back in approximately 4 weeks if she is  for repeat clinical exam.  Otherwise we will see her back for her hip at 1 year. She is still symptomatic with respect to her knee, either nonsurgical or surgical intervention could be entertained at that time.

## 2020-12-22 ENCOUNTER — HOSPITAL ENCOUNTER (OUTPATIENT)
Dept: PHYSICAL THERAPY | Age: 65
Setting detail: THERAPIES SERIES
Discharge: HOME | End: 2020-12-22
Payer: MEDICARE

## 2020-12-22 PROCEDURE — 97110 THERAPEUTIC EXERCISES: CPT | Performed by: PHYSICAL THERAPIST

## 2020-12-22 PROCEDURE — 97112 NEUROMUSCULAR REEDUCATION: CPT | Performed by: PHYSICAL THERAPIST

## 2020-12-22 NOTE — FLOWSHEET NOTE
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 Duyen Ceron  Has the plan of care been signed (Y/N):        []  Yes  [x]  No     Date of Patient follow up with Physician: 2020      Is this a Progress Report:     []  Yes  [x]  No        If Yes:  Date Range for reporting period:  Beginning 2020  Ending    Progress report will be due (10 Rx or 30 days whichever is less): 256      Recertification will be due (POC Duration  / 90 days whichever is less): 2021       Visit # Insurance Allowable Auth Required   3 Needs Auth []  Yes []  No        Functional Scale: LEFS 58%   Date assessed:  2020     Latex Allergy:  [x]NO      []YES  Preferred Language for Healthcare:   [x]English       []other:      Pain level:  1-5/10     SUBJECTIVE:  States she is hurting more.  Knee needs replaced and pain is non stop    OBJECTIVE: See eval  ? Observation:   ? Test measurements:  78 deg hip flex AROM    RESTRICTIONS/PRECAUTIONS: s/p anterior approach L AALIYAH 2020 8 wk OV 2021   history of skin CA, OA  Left knee needs replaced too, anxiety/depression    Exercises/Interventions:   Therapeutic Ex (69327) Sets/sec Reps Notes/CUES HEP   Bike ROM  Supine TA 5 min  5\"    Not to increas symptoms  cues    Supine TA heel slide   cues    Supine ball sqz 5\" 30 cues    Supine clam limited ROM   Silver, cues    Supine TA  cues Quad sets 10\" 10 cues    HR's  30 cues    Standing hip ABD lime 25 Limited ROM, cues    LAQ  HS curl  Step ups  Bridges  SL clam L 1#  lime  2\" 30  30  10  30  30     Cues  Cues  Cues, not to overdo/increas symptoms  vc                  Pt education 10'  Reviewed precautions, HEP with gradual progression, goals of PT, not to push through pain with ex, use of ice- pt stated understanding    Manual Intervention (38685)                                                 NMR re-education (13316)   CUES NEEDED                                                                   Therapeutic Activity (25422)                                                     Therapeutic Exercise and NMR EXR  [x] (62965) Provided verbal/tactile cueing for activities related to strengthening, flexibility, endurance, ROM for improvements in LE, proximal hip, and core control with self care, mobility, lifting, ambulation. [x] (81546) Provided verbal/tactile cueing for activities related to improving balance, coordination, kinesthetic sense, posture, motor skill, proprioception to assist with LE, proximal hip, and core control in self-care, mobility, lifting, ambulation and eccentric single leg control.      NMR and Therapeutic Activities:    [x] (90630 or 07424) Provided verbal/tactile cueing for activities related to improving balance, coordination, kinesthetic sense, posture, motor skill, proprioception and motor activation to allow for proper function of core, proximal hip and LE with self-care and ADLs and functional mobility.   [] (64420) Gait Re-education- Provided training and instruction to the patient for proper LE, core and proximal hip recruitment and positioning and eccentric body weight control with ambulation re-education including up and down stairs     Home Exercise Program: [x] (92777) Reviewed/Progressed HEP activities related to strengthening, flexibility, endurance, ROM of core, proximal hip and LE for functional self-care, mobility, lifting and ambulation/stair navigation   [] (06656) Reviewed/Progressed HEP activities related to improving balance, coordination, kinesthetic sense, posture, motor skill, proprioception of core, proximal hip and LE for self-care, mobility, lifting, and ambulation/stair navigation      Manual Treatments:  PROM / STM / Oscillations-Mobs:  G-I, II, III, IV (PA's, Inf., Post.)  [] (73140) Provided manual therapy to mobilize LE, proximal hip and/or LS spine soft tissue/joints for the purpose of modulating pain, promoting relaxation, increasing ROM, reducing/eliminating soft tissue swelling/inflammation/restriction, improving soft tissue extensibility and allowing for proper ROM for normal function with self-care, mobility, lifting and ambulation. Modalities:  CP 10'   [] GAME READY (VASO)- for significant edema, swelling, pain control. Charges:  Timed Code Treatment Minutes: 38   Total Treatment Minutes: 48      [] EVAL (LOW) 64420 (typically 20 minutes face-to-face)  [] EVAL (MOD) 83736 (typically 30 minutes face-to-face)  [] EVAL (HIGH) 56810 (typically 45 minutes face-to-face)  [] RE-EVAL     [x] QG(45779) x   2  [] IONTO  [x] NMR (18871) x     [] VASO  [] Manual (19528) x     [] Other:  [] TA x      [] Mech Traction (89724)  [] ES(attended) (40712)      [] ES (un) (71316):    ASSESSMENT:   Added to HEP. To use assist device to rest leg. Cues for ex muscle recruitment    GOALS:     Patient stated goal: Walking, lift weights, elliptical.     Therapist goals for Patient:   Short Term Goals: To be achieved in: 2 weeks  1. Independent in HEP and progression per patient tolerance, in order to prevent re-injury.    [x] Progressing: [] Met: [] Not Met: [] Adjusted 2. Patient will have a decrease in pain to facilitate improvement in movement, function, and ADLs as indicated by Functional Deficits. [x] Progressing: [] Met: [] Not Met: [] Adjusted     Long Term Goals: To be achieved in: 12 weeks  1. Disability index score of 27% or less for the LEFS to assist with reaching prior level of function. [x] Progressing: [] Met: [] Not Met: [] Adjusted     2. Patient will demonstrate increased AROM to  100 hip flex to allow for proper joint functioning as indicated by patients Functional Deficits. [x] Progressing: [] Met: [] Not Met: [] Adjusted     3. Patient will demonstrate an increase in Strength to good proximal hip strength and control, within 5lb HHD in LE to allow for proper functional mobility as indicated by patients Functional Deficits. [x] Progressing: [] Met: [] Not Met: [] Adjusted     4. Patient will return to functional activities without increased symptoms or restriction. [x] Progressing: [] Met: [] Not Met: [] Adjusted     5. Patient will return to walking 20-30 mins I no AD without increased symptoms or restriction. (patient specific functional goal)    [x] Progressing: [] Met: [] Not Met: [] Adjusted         Overall Progression Towards Functional goals/ Treatment Progress Update:  [x] Patient is progressing as expected towards functional goals listed. [] Progression is slowed due to complexities/Impairments listed. [] Progression has been slowed due to co-morbidities.   [] Plan just implemented, too soon to assess goals progression <30days   [] Goals require adjustment due to lack of progress  [] Patient is not progressing as expected and requires additional follow up with physician  [] Other    Prognosis for POC: [x] Good [] Fair  [] Poor      Patient requires continued skilled intervention: [x] Yes  [] No    Treatment/Activity Tolerance:  [x] Patient able to complete treatment  [] Patient limited by fatigue [] Patient limited by pain    [] Patient limited by other medical complications  [] Other:     Return to Play: (if applicable)   []  Stage 1: Intro to Strength   []  Stage 2: Return to Run and Strength   []  Stage 3: Return to Jump and Strength   []  Stage 4: Dynamic Strength and Agility   []  Stage 5: Sport Specific Training     []  Ready to Return to Play, Meets All Above Stages   []  Not Ready for Return to Sports   Comments:                          PLAN: See eval  [x] Continue per plan of care [] Alter current plan (see comments above)  [] Plan of care initiated [] Hold pending MD visit [] Discharge    Electronically signed by:  Lay Sharma PT    Note: If patient does not return for scheduled/ recommended follow up visits, this note will serve as a discharge from care along with most recent update on progress.

## 2020-12-24 ENCOUNTER — HOSPITAL ENCOUNTER (OUTPATIENT)
Dept: PHYSICAL THERAPY | Age: 65
Setting detail: THERAPIES SERIES
Discharge: HOME | End: 2020-12-24
Payer: MEDICARE

## 2020-12-29 ENCOUNTER — HOSPITAL ENCOUNTER (OUTPATIENT)
Dept: PHYSICAL THERAPY | Age: 65
Setting detail: THERAPIES SERIES
Discharge: HOME | End: 2020-12-29
Payer: MEDICARE

## 2020-12-29 PROCEDURE — 97110 THERAPEUTIC EXERCISES: CPT

## 2020-12-29 PROCEDURE — 97112 NEUROMUSCULAR REEDUCATION: CPT

## 2020-12-29 NOTE — FLOWSHEET NOTE
f    [facility], [facility], [address] [place] [address] [address]  Phone: [phone]   Fax:     [phone]      Physical Therapy Treatment Note/ Progress Report:     Date:  2020    Patient Name:  Chaz Blair    :  1955  MRN: [id number]  Restrictions/Precautions:    Medical/Treatment Diagnosis Information:  · Diagnosis: Status post total hip replacement, left 2/2 L hip OA M16.12  · Treatment Diagnosis: Left hip pain H37.309  Insurance/Certification information:  PT Insurance Information: Sherrie  Physician Information:  Referring Practitioner: Dr. Genevieve Sandoval  Has the plan of care been signed (Y/N):        []  Yes  [x]  No     Date of Patient follow up with Physician: 2020      Is this a Progress Report:     []  Yes  [x]  No        If Yes:  Date Range for reporting period:  Beginning 2020  Ending    Progress report will be due (10 Rx or 30 days whichever is less):       Recertification will be due (POC Duration  / 90 days whichever is less): 2021       Visit # Insurance Allowable Auth Required   4 Needs Auth []  Yes []  No        Functional Scale: LEFS 58%   Date assessed:  2020     Latex Allergy:  [x]NO      []YES  Preferred Language for Healthcare:   [x]English       []other:      Pain level:  1-5/10     SUBJECTIVE:   Pt reports feels okay right now. Stated has been walking and pushing through pain even though she was told not to.      OBJECTIVE: See eval  ? Observation:   ? Test measurements:  78 deg hip flex AROM    RESTRICTIONS/PRECAUTIONS: s/p anterior approach L AALIYAH 2020 8 wk OV 2021   history of skin CA, OA  Left knee needs replaced too, anxiety/depression    Exercises/Interventions:   Therapeutic Ex (41087) Sets/sec Reps Notes/CUES HEP   Bike ROM  Supine TA 5 min  5\"    Not to increas symptoms  cues    Supine TA heel slide   cues    Supine ball sqz 5\" 30 cues Supine clam limited ROM   Silver, cues    Supine TA march 2 15 cues    Quad sets 10\"  cues    HR's  30 cues    Standing hip ABD 2# 30 Limited ROM, cues    LAQ  HS curl standing  Step ups  Bridges  SL clam L  SLR 2#  2#  2\" 30  30  10  30  30 green  20, 10     Cues  Cues  Cues, not to overdo/increas symptoms  vc                  Pt education 10'  Reviewed precautions, HEP with gradual progression, goals of PT, not to push through pain with ex, use of ice- pt stated understanding    Manual Intervention (73997)                                                 NMR re-education (67483)   CUES NEEDED                                                                   Therapeutic Activity (30209)                                                     Therapeutic Exercise and NMR EXR  [x] (39454) Provided verbal/tactile cueing for activities related to strengthening, flexibility, endurance, ROM for improvements in LE, proximal hip, and core control with self care, mobility, lifting, ambulation. [x] (95306) Provided verbal/tactile cueing for activities related to improving balance, coordination, kinesthetic sense, posture, motor skill, proprioception to assist with LE, proximal hip, and core control in self-care, mobility, lifting, ambulation and eccentric single leg control.      NMR and Therapeutic Activities:    [x] (90614 or 21000) Provided verbal/tactile cueing for activities related to improving balance, coordination, kinesthetic sense, posture, motor skill, proprioception and motor activation to allow for proper function of core, proximal hip and LE with self-care and ADLs and functional mobility.   [] (25902) Gait Re-education- Provided training and instruction to the patient for proper LE, core and proximal hip recruitment and positioning and eccentric body weight control with ambulation re-education including up and down stairs     Home Exercise Program: [x] (97999) Reviewed/Progressed HEP activities related to strengthening, flexibility, endurance, ROM of core, proximal hip and LE for functional self-care, mobility, lifting and ambulation/stair navigation   [] (36850) Reviewed/Progressed HEP activities related to improving balance, coordination, kinesthetic sense, posture, motor skill, proprioception of core, proximal hip and LE for self-care, mobility, lifting, and ambulation/stair navigation      Manual Treatments:  PROM / STM / Oscillations-Mobs:  G-I, II, III, IV (PA's, Inf., Post.)  [] (43677) Provided manual therapy to mobilize LE, proximal hip and/or LS spine soft tissue/joints for the purpose of modulating pain, promoting relaxation, increasing ROM, reducing/eliminating soft tissue swelling/inflammation/restriction, improving soft tissue extensibility and allowing for proper ROM for normal function with self-care, mobility, lifting and ambulation. Modalities:  CP 10'   [] GAME READY (VASO)- for significant edema, swelling, pain control. Charges:  Timed Code Treatment Minutes: 38   Total Treatment Minutes: 48      [] EVAL (LOW) 77753 (typically 20 minutes face-to-face)  [] EVAL (MOD) 30794 (typically 30 minutes face-to-face)  [] EVAL (HIGH) 06593 (typically 45 minutes face-to-face)  [] RE-EVAL     [x] SV(60729) x   2  [] IONTO  [x] NMR (41385) x     [] VASO  [] Manual (54152) x     [] Other:  [] TA x      [] Mech Traction (25066)  [] ES(attended) (30206)      [] ES (un) (48869):    ASSESSMENT:   Added to HEP and dc some initial exercises. To use assist device to rest leg and 2/2 pain during ambulation. Reviewed to not push through pain with ex or activity. Cues for ex muscle recruitment. Educated pt to [employer] for [employer] for visits. GOALS:     Patient stated goal: Walking, lift weights, elliptical.     Therapist goals for Patient:   Short Term Goals:  To be achieved in: 2 weeks 1. Independent in HEP and progression per patient tolerance, in order to prevent re-injury. [x] Progressing: [] Met: [] Not Met: [] Adjusted     2. Patient will have a decrease in pain to facilitate improvement in movement, function, and ADLs as indicated by Functional Deficits. [x] Progressing: [] Met: [] Not Met: [] Adjusted     Long Term Goals: To be achieved in: 12 weeks  1. Disability index score of 27% or less for the LEFS to assist with reaching prior level of function. [x] Progressing: [] Met: [] Not Met: [] Adjusted     2. Patient will demonstrate increased AROM to  100 hip flex to allow for proper joint functioning as indicated by patients Functional Deficits. [x] Progressing: [] Met: [] Not Met: [] Adjusted     3. Patient will demonstrate an increase in Strength to good proximal hip strength and control, within 5lb HHD in LE to allow for proper functional mobility as indicated by patients Functional Deficits. [x] Progressing: [] Met: [] Not Met: [] Adjusted     4. Patient will return to functional activities without increased symptoms or restriction. [x] Progressing: [] Met: [] Not Met: [] Adjusted     5. Patient will return to walking 20-30 mins I no AD without increased symptoms or restriction. (patient specific functional goal)    [x] Progressing: [] Met: [] Not Met: [] Adjusted         Overall Progression Towards Functional goals/ Treatment Progress Update:  [x] Patient is progressing as expected towards functional goals listed. [] Progression is slowed due to complexities/Impairments listed. [] Progression has been slowed due to co-morbidities.   [] Plan just implemented, too soon to assess goals progression <30days   [] Goals require adjustment due to lack of progress  [] Patient is not progressing as expected and requires additional follow up with physician  [] Other    Prognosis for POC: [x] Good [] Fair  [] Poor      Patient requires continued skilled intervention: [x] Yes  [] No Treatment/Activity Tolerance:  [x] Patient able to complete treatment  [] Patient limited by fatigue  [] Patient limited by pain    [] Patient limited by other medical complications  [] Other:     Return to Play: (if applicable)   []  Stage 1: Intro to Strength   []  Stage 2: Return to Run and Strength   []  Stage 3: Return to Jump and Strength   []  Stage 4: Dynamic Strength and Agility   []  Stage 5: Sport Specific Training     []  Ready to Return to Play, Meets All Above Stages   []  Not Ready for Return to Sports   Comments:                          PLAN: See eval  [x] Continue per plan of care [] Alter current plan (see comments above)  [] Plan of care initiated [] Hold pending MD visit [] Discharge    Electronically signed by:  Virginie Dee PT    Note: If patient does not return for scheduled/ recommended follow up visits, this note will serve as a discharge from care along with most recent update on progress.

## 2020-12-30 ENCOUNTER — APPOINTMENT (OUTPATIENT)
Dept: PHYSICAL THERAPY | Age: 65
End: 2020-12-30
Payer: MEDICARE

## 2021-01-05 ENCOUNTER — HOSPITAL ENCOUNTER (OUTPATIENT)
Dept: PHYSICAL THERAPY | Age: 66
Setting detail: THERAPIES SERIES
Discharge: HOME | End: 2021-01-05
Payer: MEDICARE

## 2021-01-05 PROCEDURE — 97112 NEUROMUSCULAR REEDUCATION: CPT | Performed by: PHYSICAL THERAPIST

## 2021-01-05 PROCEDURE — 97110 THERAPEUTIC EXERCISES: CPT | Performed by: PHYSICAL THERAPIST

## 2021-01-05 NOTE — FLOWSHEET NOTE
f    723 [facility] and 500 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 Jordan Strong  Has the plan of care been signed (Y/N):        [x]  Yes  []  No     Date of Patient follow up with Physician: 2020      Is this a Progress Report:     [x]  Yes  []  No        If Yes:  Date Range for reporting period:  Beginning 2020  Ending 21    Progress report will be due (10 Rx or 30 days whichever is less): 6/3/21      Recertification will be due (POC Duration  / 90 days whichever is less): 2021       Visit # Insurance Allowable Auth Required       1 Needs Auth []  Yes []  No        Functional Scale: LEFS  45%   Date assessed:   21    Latex Allergy:  [x]NO      []YES  Preferred Language for Healthcare:   [x]English       []other:      Pain level:  1-5/10     SUBJECTIVE:   Pt reports feels okay right now. Stated has been walking and pushing through pain even though she was told not to.      OBJECTIVE: See eval               Observation:    Test measurements:  78 deg hip flex AROM    RESTRICTIONS/PRECAUTIONS: s/p anterior approach L AALIYAH 2020 8 wk OV 2021   history of skin CA, OA  Left knee needs replaced too, anxiety/depression    Exercises/Interventions:   Therapeutic Ex (41159) Sets/sec Reps Notes/CUES HEP   Bike ROM  Supine TA 5 min  5\"    Not to increas symptoms  cues    Supine TA heel slide   cues    Supine ball sqz 5\" 30 cues    Supine clam limited ROM   Silver, cues    Supine TA march 2 15 cues    Quad sets 10\"  cues    HR's  30 cues    Standing hip ABD 3# 30 Limited ROM, cues    LAQ  HS curl standing  Step ups  Bridges  SL clam L  SLR 3#  2#  2\" 30  30  10  30  30 green  20, 10     Cues  Cues  Cues, not to overdo/increas symptoms  vc    OV  1/5/2021            Pt education 10'  Reviewed precautions, HEP with gradual progression, goals of PT, not to push through pain with ex, use of ice- pt stated understanding    Manual Intervention ([id number])                                                 NMR re-education (18455)   CUES NEEDED                                                                   Therapeutic Activity (58892)                                                     Therapeutic Exercise and NMR EXR  [x] (57219) Provided verbal/tactile cueing for activities related to strengthening, flexibility, endurance, ROM for improvements in LE, proximal hip, and core control with self care, mobility, lifting, ambulation. [x] (29146) Provided verbal/tactile cueing for activities related to improving balance, coordination, kinesthetic sense, posture, motor skill, proprioception to assist with LE, proximal hip, and core control in self-care, mobility, lifting, ambulation and eccentric single leg control.      NMR and Therapeutic Activities:    [x] (52123 or 56070) Provided verbal/tactile cueing for activities related to improving balance, coordination, kinesthetic sense, posture, motor skill, proprioception and motor activation to allow for proper function of core, proximal hip and LE with self-care and ADLs and functional mobility.   [] (66174) Gait Re-education- Provided training and instruction to the patient for proper LE, core and proximal hip recruitment and positioning and eccentric body weight control with ambulation re-education including up and down stairs     Home Exercise Program:    [x] (50824) Reviewed/Progressed HEP activities related to strengthening, flexibility, endurance, ROM of core, proximal hip and LE for functional self-care, mobility, lifting and ambulation/stair navigation   [] (53264) Reviewed/Progressed HEP activities related to improving balance, coordination, kinesthetic sense, posture, motor skill, proprioception of core, proximal hip and LE for self-care, mobility, lifting, and ambulation/stair navigation      Manual Treatments:  PROM / STM / Oscillations-Mobs:  G-I, II, III, IV (PA's, Inf., Post.)  [] (46257) Provided manual therapy to mobilize LE, proximal hip and/or LS spine soft tissue/joints for the purpose of modulating pain, promoting relaxation, increasing ROM, reducing/eliminating soft tissue swelling/inflammation/restriction, improving soft tissue extensibility and allowing for proper ROM for normal function with self-care, mobility, lifting and ambulation. Modalities:  CP 10'   [] GAME READY (VASO)- for significant edema, swelling, pain control. Charges:  Timed Code Treatment Minutes: 38   Total Treatment Minutes: 48      [] EVAL (LOW) 86540 (typically 20 minutes face-to-face)  [] EVAL (MOD) 11536 (typically 30 minutes face-to-face)  [] EVAL (HIGH) 75725 (typically 45 minutes face-to-face)  [] RE-EVAL     [x] XB(41127) x   2  [] IONTO  [x] NMR (76706) x     [] VASO  [] Manual (94702) x     [] Other:  [] TA x      [] Mech Traction (56713)  [] ES(attended) (44192)      [] ES (un) (19580):    ASSESSMENT:  OV test today. Progressing towards goals    GOALS:     Patient stated goal: Walking, lift weights, elliptical.     Therapist goals for Patient:   Short Term Goals: To be achieved in: 2 weeks  1. Independent in HEP and progression per patient tolerance, in order to prevent re-injury. [x] Progressing: [] Met: [] Not Met: [] Adjusted     2. Patient will have a decrease in pain to facilitate improvement in movement, function, and ADLs as indicated by Functional Deficits.   [x] Progressing: [] Met: [] Not Met: [] Adjusted     Long Term Goals: To be achieved in: 12 weeks  1. Disability index score of 27% or less for the LEFS to assist with reaching prior level of function. [x] Progressing: [] Met: [] Not Met: [] Adjusted     2. Patient will demonstrate increased AROM to  100 hip flex to allow for proper joint functioning as indicated by patients Functional Deficits. [] Progressing: [x] Met: [] Not Met: [] Adjusted     3. Patient will demonstrate an increase in Strength to good proximal hip strength and control, within 5lb HHD in LE to allow for proper functional mobility as indicated by patients Functional Deficits. [] Progressing: [x] Met: [] Not Met: [] Adjusted     4. Patient will return to functional activities without increased symptoms or restriction. [x] Progressing: [] Met: [] Not Met: [] Adjusted     5. Patient will return to walking 20-30 mins I no AD without increased symptoms or restriction. (patient specific functional goal)    [x] Progressing: [] Met: [] Not Met: [] Adjusted         Overall Progression Towards Functional goals/ Treatment Progress Update:  [x] Patient is progressing as expected towards functional goals listed. [] Progression is slowed due to complexities/Impairments listed. [] Progression has been slowed due to co-morbidities.   [] Plan just implemented, too soon to assess goals progression <30days   [] Goals require adjustment due to lack of progress  [] Patient is not progressing as expected and requires additional follow up with physician  [] Other    Prognosis for POC: [x] Good [] Fair  [] Poor      Patient requires continued skilled intervention: [x] Yes  [] No    Treatment/Activity Tolerance:  [x] Patient able to complete treatment  [] Patient limited by fatigue  [] Patient limited by pain    [] Patient limited by other medical complications  [] Other:     Return to Play: (if applicable)   []  Stage 1: Intro to Strength   []  Stage 2: Return to Run and Strength   []  Stage 3: Return to Jump and Strength   []  Stage 4: Dynamic Strength and Agility   []  Stage 5: Sport Specific Training     []  Ready to Return to Play, Meets All Above Stages   []  Not Ready for Return to Sports   Comments:                          PLAN: 1-2x/week 6 weeks  [x] Continue per plan of care [] Alter current plan (see comments above)  [] Plan of care initiated [] Hold pending MD visit [] Discharge    Electronically signed by:  Yadi Yoder PT    Note: If patient does not return for scheduled/ recommended follow up visits, this note will serve as a discharge from care along with most recent update on progress.

## 2021-01-07 ENCOUNTER — HOSPITAL ENCOUNTER (OUTPATIENT)
Dept: PHYSICAL THERAPY | Age: 66
Setting detail: THERAPIES SERIES
Discharge: HOME | End: 2021-01-07
Payer: MEDICARE

## 2021-01-07 PROCEDURE — 97112 NEUROMUSCULAR REEDUCATION: CPT | Performed by: PHYSICAL THERAPIST

## 2021-01-07 PROCEDURE — 97110 THERAPEUTIC EXERCISES: CPT | Performed by: PHYSICAL THERAPIST

## 2021-01-07 NOTE — FLOWSHEET NOTE
f    [address] [place] [address] and 500 [facility], [address] [place] [address] 3560 [facility], [address] [place] [address] [address]  Phone: [phone]   Fax:     [phone]      Physical Therapy Treatment Note/ Progress Report:     Date:  2021    Patient Name:  Hugo Salinas    :  1955  MRN: [id number]  Restrictions/Precautions:    Medical/Treatment Diagnosis Information:  · Diagnosis: Status post total hip replacement, left 2/2 L hip OA M16.12  · Treatment Diagnosis: Left hip pain B57.180  Insurance/Certification information:  PT Insurance Information: [place]  Physician Information:  Referring Practitioner: Dr. Adina Roy  Has the plan of care been signed (Y/N):        [x]  Yes  []  No     Date of Patient follow up with Physician: 2020      Is this a Progress Report:     []  Yes  [x]  No        If Yes:  Date Range for reporting period:  Beginning 2020  Ending 21    Progress report will be due (10 Rx or 30 days whichever is less): 65      Recertification will be due (POC Duration  / 90 days whichever is less): 2021       Visit # Insurance Allowable Auth Required         2 Needs Auth []  Yes []  No        Functional Scale: LEFS  45%   Date assessed:   21    Latex Allergy:  [x]NO      []YES  Preferred Language for Healthcare:   [x]English       []other:      Pain level:  1-5/10     SUBJECTIVE:   States her HS is sore from trying to walk normal    OBJECTIVE:    Observation:    Test measurements:      RESTRICTIONS/PRECAUTIONS: s/p anterior approach L AALIYAH 2020    history of skin CA, OA  Left knee needs replaced too, anxiety/depression    Exercises/Interventions:   2020 L AALIYAH  Therapeutic Ex (32525) Sets/sec Reps Notes/CUES HEP   Bike ROM  Supine TA 6 min  5\"    Not to increas symptoms  cues    Supine TA heel slide   cues    Supine ball sqz 5\" 30 cues    Supine clam limited ROM   Silver, cues    Supine TA march 2 15 cues    Quad sets 10\"  cues    HR's  30 cues    Standing hip ABD/ext 4# 30/30 Limited ROM, cues    LAQ  HS curl standing  Step ups  Bridges  SL clam L  SLR  TKE  SL stance  Prone press up for hip stretch 4#  4#  6\"        Blue  L  reviewed 30  30  10  30  30 green  20, 10  30  10x10     Cues  Cues  Cues, not to overdo/increas symptoms  vc      vc  vc    OV  1/5/2021            Pt education 10'  Reviewed precautions, HEP with gradual progression, goals of PT, not to push through pain with ex, use of ice- pt stated understanding    Manual Intervention ([id number])                                                 NMR re-education (94250)   CUES NEEDED                                                                   Therapeutic Activity (05255)                                                     Therapeutic Exercise and NMR EXR  [x] (44176) Provided verbal/tactile cueing for activities related to strengthening, flexibility, endurance, ROM for improvements in LE, proximal hip, and core control with self care, mobility, lifting, ambulation. [x] (42193) Provided verbal/tactile cueing for activities related to improving balance, coordination, kinesthetic sense, posture, motor skill, proprioception to assist with LE, proximal hip, and core control in self-care, mobility, lifting, ambulation and eccentric single leg control.      NMR and Therapeutic Activities:    [x] (88804 or 13522) Provided verbal/tactile cueing for activities related to improving balance, coordination, kinesthetic sense, posture, motor skill, proprioception and motor activation to allow for proper function of core, proximal hip and LE with self-care and ADLs and functional mobility.   [] (37171) Gait Re-education- Provided training and instruction to the patient for proper LE, core and proximal hip recruitment and positioning and eccentric body weight control with ambulation re-education including up and down stairs     Home Exercise Program:    [x] (98770) Reviewed/Progressed HEP activities related to strengthening, flexibility, endurance, ROM of core, proximal hip and LE for functional self-care, mobility, lifting and ambulation/stair navigation   [] (54982) Reviewed/Progressed HEP activities related to improving balance, coordination, kinesthetic sense, posture, motor skill, proprioception of core, proximal hip and LE for self-care, mobility, lifting, and ambulation/stair navigation      Manual Treatments:  PROM / STM / Oscillations-Mobs:  G-I, II, III, IV (PA's, Inf., Post.)  [] (40883) Provided manual therapy to mobilize LE, proximal hip and/or LS spine soft tissue/joints for the purpose of modulating pain, promoting relaxation, increasing ROM, reducing/eliminating soft tissue swelling/inflammation/restriction, improving soft tissue extensibility and allowing for proper ROM for normal function with self-care, mobility, lifting and ambulation. Modalities:  CP 10'   [] GAME READY (VASO)- for significant edema, swelling, pain control. Charges:  Timed Code Treatment Minutes: 38   Total Treatment Minutes: 48      [] EVAL (LOW) 30004 (typically 20 minutes face-to-face)  [] EVAL (MOD) 96733 (typically 30 minutes face-to-face)  [] EVAL (HIGH) 89135 (typically 45 minutes face-to-face)  [] RE-EVAL     [x] VQ(78687) x   2  [] IONTO  [x] NMR (13613) x     [] VASO  [] Manual (57970) x     [] Other:  [] TA x      [] Mech Traction (13368)  [] ES(attended) (58856)      [] ES (un) (95475):    ASSESSMENT: .  Progressing towards goals. amb with good hip ext during gait. Steps increase her knee OA pain and occasionally gives out on her. GOALS:     Patient stated goal: Walking, lift weights, elliptical.     Therapist goals for Patient:   Short Term Goals: To be achieved in: 2 weeks  1. Independent in HEP and progression per patient tolerance, in order to prevent re-injury. [x] Progressing: [] Met: [] Not Met: [] Adjusted     2.  Patient will have a decrease in pain to facilitate improvement in movement, function, and ADLs as indicated by Functional Deficits. [x] Progressing: [] Met: [] Not Met: [] Adjusted     Long Term Goals: To be achieved in: 12 weeks  1. Disability index score of 27% or less for the LEFS to assist with reaching prior level of function. [x] Progressing: [] Met: [] Not Met: [] Adjusted     2. Patient will demonstrate increased AROM to  100 hip flex to allow for proper joint functioning as indicated by patients Functional Deficits. [] Progressing: [x] Met: [] Not Met: [] Adjusted     3. Patient will demonstrate an increase in Strength to good proximal hip strength and control, within 5lb HHD in LE to allow for proper functional mobility as indicated by patients Functional Deficits. [] Progressing: [x] Met: [] Not Met: [] Adjusted     4. Patient will return to functional activities without increased symptoms or restriction. [x] Progressing: [] Met: [] Not Met: [] Adjusted     5. Patient will return to walking 20-30 mins I no AD without increased symptoms or restriction. (patient specific functional goal)    [x] Progressing: [] Met: [] Not Met: [] Adjusted         Overall Progression Towards Functional goals/ Treatment Progress Update:  [x] Patient is progressing as expected towards functional goals listed. [] Progression is slowed due to complexities/Impairments listed. [] Progression has been slowed due to co-morbidities.   [] Plan just implemented, too soon to assess goals progression <30days   [] Goals require adjustment due to lack of progress  [] Patient is not progressing as expected and requires additional follow up with physician  [] Other    Prognosis for POC: [x] Good [] Fair  [] Poor      Patient requires continued skilled intervention: [x] Yes  [] No    Treatment/Activity Tolerance:  [x] Patient able to complete treatment  [] Patient limited by fatigue  [] Patient limited by pain    [] Patient limited by other medical complications  [] Other:     Return to Play: (if applicable)   [] Stage 1: Intro to Strength   []  Stage 2: Return to Run and Strength   []  Stage 3: Return to Jump and Strength   []  Stage 4: Dynamic Strength and Agility   []  Stage 5: Sport Specific Training     []  Ready to Return to Play, Meets All Above Stages   []  Not Ready for Return to Sports   Comments:                          PLAN: 1-2x/week 6 weeks  [x] Continue per plan of care [] Alter current plan (see comments above)  [] Plan of care initiated [] Hold pending MD visit [] Discharge    Electronically signed by:  Austin Reed PT    Note: If patient does not return for scheduled/ recommended follow up visits, this note will serve as a discharge from care along with most recent update on progress.

## 2021-01-11 ENCOUNTER — HOSPITAL ENCOUNTER (OUTPATIENT)
Dept: PHYSICAL THERAPY | Age: 66
Setting detail: THERAPIES SERIES
Discharge: HOME | End: 2021-01-11
Payer: MEDICARE

## 2021-01-11 PROCEDURE — 97140 MANUAL THERAPY 1/> REGIONS: CPT | Performed by: PHYSICAL THERAPIST

## 2021-01-11 PROCEDURE — 97110 THERAPEUTIC EXERCISES: CPT | Performed by: PHYSICAL THERAPIST

## 2021-01-11 NOTE — FLOWSHEET NOTE
f    723 [facility], [address] [place] [address], [address] [place] [address] [address]  Phone: [phone]   Fax:     [phone]      Physical Therapy Treatment Note/ Progress Report:     Date:  2021    Patient Name:  Zoya Alcaraz    :  1955  MRN: [id number]  Restrictions/Precautions:    Medical/Treatment Diagnosis Information:  · Diagnosis: Status post total hip replacement, left 2/2 L hip OA M16.12  · Treatment Diagnosis: Left hip pain P61.867  Insurance/Certification information:  PT Insurance Information: [place]  Physician Information:  Referring Practitioner: Dr. Erlinda Medel  Has the plan of care been signed (Y/N):        [x]  Yes  []  No     Date of Patient follow up with Physician: 2020      Is this a Progress Report:     []  Yes  [x]  No        If Yes:  Date Range for reporting period:  Beginning 21  Ending     Progress report will be due (10 Rx or 30 days whichever is less): 88      Recertification will be due (POC Duration  / 90 days whichever is less): 2021       Visit # Insurance Allowable Auth Required      3 Needs Auth  Needs auth []  Yes []  No        Functional Scale: LEFS  45%   Date assessed:   21    Latex Allergy:  [x]NO      []YES  Preferred Language for Healthcare:   [x]English       []other:      Pain level:  1-5/10     SUBJECTIVE:   States Saturday she worked 12 hrs taking down her [place] and going up and down 13 steps all day. She walked  and today feels she has been punched in the butt.     OBJECTIVE:   ? Observation:   ? Test measurements:      RESTRICTIONS/PRECAUTIONS: s/p anterior approach L AALIYAH 2020    history of skin CA, OA  Left knee needs replaced too, anxiety/depression    Exercises/Interventions:   2020 L AALIYAH  Therapeutic Ex (08353) Sets/sec Reps Notes/CUES HEP   Bike ROM  Supine TA 6 min  5\"    Not to increas symptoms  cues    Supine TA heel slide   cues    Supine ball sqz 5\"  cues Supine clam limited ROM   Silver, cues    Supine TA march 2 15 cues    Quad sets 10\"  cues    HR's  30 cues    Standing hip ABD/ext  10/10 Limited ROM, cues    LAQ L  HS curl standing  Step ups  Bridges  SL clam L  SLR  TKE  SL stance  Prone press up for hip stretch 10#  4#          Blue  L  reviewed 18          20, 10  30       Cues  Cues  Cues, not to overdo/increas symptoms  vc      vc  vc    OV  1/5/2021            Pt education 10'  Reviewed precautions, HEP with gradual progression, goals of PT, not to push through pain with ex, use of ice- pt stated understanding    Manual Intervention (80427)       Roller stick L piriformis/gluts  10 min                                        NMR re-education (22295)   CUES NEEDED                                                                   Therapeutic Activity (66271)                                                     Therapeutic Exercise and NMR EXR  [x] (66774) Provided verbal/tactile cueing for activities related to strengthening, flexibility, endurance, ROM for improvements in LE, proximal hip, and core control with self care, mobility, lifting, ambulation. [x] (33606) Provided verbal/tactile cueing for activities related to improving balance, coordination, kinesthetic sense, posture, motor skill, proprioception to assist with LE, proximal hip, and core control in self-care, mobility, lifting, ambulation and eccentric single leg control. NMR and Therapeutic Activities:    [x] (37550 or 49952) Provided verbal/tactile cueing for activities related to improving balance, coordination, kinesthetic sense, posture, motor skill, proprioception and motor activation to allow for proper function of core, proximal hip and LE with self-care and ADLs and functional mobility. [] (57494) Gait Re-education- Provided training and instruction to the patient for proper LE, core and proximal hip recruitment and positioning and eccentric body weight control with ambulation re-education including up and down stairs     Home Exercise Program:    [x] (72345) Reviewed/Progressed HEP activities related to strengthening, flexibility, endurance, ROM of core, proximal hip and LE for functional self-care, mobility, lifting and ambulation/stair navigation   [] (63229) Reviewed/Progressed HEP activities related to improving balance, coordination, kinesthetic sense, posture, motor skill, proprioception of core, proximal hip and LE for self-care, mobility, lifting, and ambulation/stair navigation      Manual Treatments:  PROM / STM / Oscillations-Mobs:  G-I, II, III, IV (PA's, Inf., Post.)  [x] (42016) Provided manual therapy to mobilize LE, proximal hip and/or LS spine soft tissue/joints for the purpose of modulating pain, promoting relaxation, increasing ROM, reducing/eliminating soft tissue swelling/inflammation/restriction, improving soft tissue extensibility and allowing for proper ROM for normal function with self-care, mobility, lifting and ambulation. Modalities:  CP 10' piriformis,   HP hip 10 min   [] GAME READY (VASO)- for significant edema, swelling, pain control. Charges:  Timed Code Treatment Minutes: 30   Total Treatment Minutes: 40      [] EVAL (LOW) 43773 (typically 20 minutes face-to-face)  [] EVAL (MOD) 06287 (typically 30 minutes face-to-face)  [] EVAL (HIGH) 81051 (typically 45 minutes face-to-face)  [] RE-EVAL     [x] KU(34865) x     [] IONTO  [] NMR (29237) x     [] VASO  [x] Manual (12313) x     [] Other:  [] TA x      [] Mech Traction (79884)  [] ES(attended) (13630)      [] ES (un) (57154):    ASSESSMENT: .  Held some ex secondary to increased pain from all the squatting and steps she did on Saturday.  To rest for a couple of days    GOALS: Patient stated goal: Walking, lift weights, elliptical.     Therapist goals for Patient:   Short Term Goals: To be achieved in: 2 weeks  1. Independent in HEP and progression per patient tolerance, in order to prevent re-injury. [x] Progressing: [] Met: [] Not Met: [] Adjusted     2. Patient will have a decrease in pain to facilitate improvement in movement, function, and ADLs as indicated by Functional Deficits. [x] Progressing: [] Met: [] Not Met: [] Adjusted     Long Term Goals: To be achieved in: 12 weeks  1. Disability index score of 27% or less for the LEFS to assist with reaching prior level of function. [x] Progressing: [] Met: [] Not Met: [] Adjusted     2. Patient will demonstrate increased AROM to  100 hip flex to allow for proper joint functioning as indicated by patients Functional Deficits. [] Progressing: [x] Met: [] Not Met: [] Adjusted     3. Patient will demonstrate an increase in Strength to good proximal hip strength and control, within 5lb HHD in LE to allow for proper functional mobility as indicated by patients Functional Deficits. [] Progressing: [x] Met: [] Not Met: [] Adjusted     4. Patient will return to functional activities without increased symptoms or restriction. [x] Progressing: [] Met: [] Not Met: [] Adjusted     5. Patient will return to walking 20-30 mins I no AD without increased symptoms or restriction. (patient specific functional goal)    [x] Progressing: [] Met: [] Not Met: [] Adjusted         Overall Progression Towards Functional goals/ Treatment Progress Update:  [x] Patient is progressing as expected towards functional goals listed. [] Progression is slowed due to complexities/Impairments listed. [] Progression has been slowed due to co-morbidities.   [] Plan just implemented, too soon to assess goals progression <30days   [] Goals require adjustment due to lack of progress [] Patient is not progressing as expected and requires additional follow up with physician  [] Other    Prognosis for POC: [x] Good [] Fair  [] Poor      Patient requires continued skilled intervention: [x] Yes  [] No    Treatment/Activity Tolerance:  [x] Patient able to complete treatment  [] Patient limited by fatigue  [] Patient limited by pain    [] Patient limited by other medical complications  [] Other:     Return to Play: (if applicable)   []  Stage 1: Intro to Strength   []  Stage 2: Return to Run and Strength   []  Stage 3: Return to Jump and Strength   []  Stage 4: Dynamic Strength and Agility   []  Stage 5: Sport Specific Training     []  Ready to Return to Play, Meets All Above Stages   []  Not Ready for Return to Sports   Comments:                          PLAN: 1-2x/week 6 weeks  [x] Continue per plan of care [] Alter current plan (see comments above)  [] Plan of care initiated [] Hold pending MD visit [] Discharge    Electronically signed by:  Dana Castro PT    Note: If patient does not return for scheduled/ recommended follow up visits, this note will serve as a discharge from care along with most recent update on progress.

## 2021-01-14 ENCOUNTER — HOSPITAL ENCOUNTER (OUTPATIENT)
Dept: PHYSICAL THERAPY | Age: 66
Setting detail: THERAPIES SERIES
Discharge: HOME | End: 2021-01-14
Payer: MEDICARE

## 2021-01-14 ENCOUNTER — OFFICE VISIT (OUTPATIENT)
Dept: ORTHOPEDIC SURGERY | Age: 66
End: 2021-01-14

## 2021-01-14 DIAGNOSIS — Z96.642 STATUS POST TOTAL HIP REPLACEMENT, LEFT: Primary | ICD-10-CM

## 2021-01-14 PROCEDURE — 97110 THERAPEUTIC EXERCISES: CPT

## 2021-01-14 PROCEDURE — 97112 NEUROMUSCULAR REEDUCATION: CPT

## 2021-01-14 PROCEDURE — 99024 POSTOP FOLLOW-UP VISIT: CPT | Performed by: ORTHOPAEDIC SURGERY

## 2021-01-14 NOTE — LETTER
[address] [place] [address] Dr Ernestina Rhodes [facility] [address]  Phone: [phone]  Fax: [phone]    Chayo Sharp MD        January 14, 2021     Patient: Emry Schlatter   YOB: 1955   Date of Visit: 1/14/2021       To Whom It May Concern: It is my medical opinion that Elisabeth Alan may return to full duty immediately with no restrictions. If you have any questions or concerns, please don't hesitate to call.     Sincerely,    1/14/2021 11:29 AM    Chayo Sharp MD

## 2021-01-14 NOTE — PROGRESS NOTES
Patient is approximately 9 weeks status post left anterior total hip replacement. Overall she is doing extremely well. Her pain is well controlled. She does get a little pain still and stiffness with hip flexion but otherwise happy with her progress    Pain Assessment  Location of Pain: Pelvis(hip)  Location Modifiers: Left  Severity of Pain: 0  Aggravating Factors: Other (Comment), Squatting, Stairs(getting in and out of car; out of chair)]    On physical exam, the patient's incision looks excellent, is no signs of infection or DVT. Neurovascular exam is intact. Leg lengths are equal.  She is ambulating well without any ambulatory aids and no evidence of any antalgic gait    As time, I recommend continued range of motion, strengthening exercises with outpatient as well therapy. She understands it may take 6 months to a year for full recovery. Follow-up in 1 year for annual checkup or sooner if there is any trouble.

## 2021-01-14 NOTE — FLOWSHEET NOTE
30 cues    Standing hip ABD/ext  10/10 Limited ROM, cues    LAQ L  HS curl standing  Step ups  Bridges  SL clam L  SLR  TKE  SL stance  Prone press up for hip stretch 10#  4#  4\"          L  reviewed 18  30  20  30  30 blue  30  30 blk  10x10     Cues  Cues  Cues, not to overdo/increas symptoms  vc      vc  +Foam, vc    OV  1/5/2021            Pt education 10'  Reviewed precautions, HEP with gradual progression, goals of PT, not to push through pain with ex, use of ice- pt stated understanding    Manual Intervention (66326)       Roller stick L piriformis/gluts  10 min                                        NMR re-education (67012)   CUES NEEDED                                                                   Therapeutic Activity (77466)                                                     Therapeutic Exercise and NMR EXR  [x] (47453) Provided verbal/tactile cueing for activities related to strengthening, flexibility, endurance, ROM for improvements in LE, proximal hip, and core control with self care, mobility, lifting, ambulation. [x] (77347) Provided verbal/tactile cueing for activities related to improving balance, coordination, kinesthetic sense, posture, motor skill, proprioception to assist with LE, proximal hip, and core control in self-care, mobility, lifting, ambulation and eccentric single leg control.      NMR and Therapeutic Activities:    [x] (09766 or 83172) Provided verbal/tactile cueing for activities related to improving balance, coordination, kinesthetic sense, posture, motor skill, proprioception and motor activation to allow for proper function of core, proximal hip and LE with self-care and ADLs and functional mobility.   [] (44755) Gait Re-education- Provided training and instruction to the patient for proper LE, core and proximal hip recruitment and positioning and eccentric body weight control with ambulation re-education including up and down stairs     Home Exercise Program:    [x] (69590) Reviewed/Progressed HEP activities related to strengthening, flexibility, endurance, ROM of core, proximal hip and LE for functional self-care, mobility, lifting and ambulation/stair navigation   [] (03309) Reviewed/Progressed HEP activities related to improving balance, coordination, kinesthetic sense, posture, motor skill, proprioception of core, proximal hip and LE for self-care, mobility, lifting, and ambulation/stair navigation      Manual Treatments:  PROM / STM / Oscillations-Mobs:  G-I, II, III, IV (PA's, Inf., Post.)  [x] (32645) Provided manual therapy to mobilize LE, proximal hip and/or LS spine soft tissue/joints for the purpose of modulating pain, promoting relaxation, increasing ROM, reducing/eliminating soft tissue swelling/inflammation/restriction, improving soft tissue extensibility and allowing for proper ROM for normal function with self-care, mobility, lifting and ambulation. Modalities:  CP 10' piriformis,   HP hip 10 min   [] GAME READY (VASO)- for significant edema, swelling, pain control. Charges:  Timed Code Treatment Minutes: 40   Total Treatment Minutes: 50      [] EVAL (LOW) 00701 (typically 20 minutes face-to-face)  [] EVAL (MOD) 48558 (typically 30 minutes face-to-face)  [] EVAL (HIGH) 92887 (typically 45 minutes face-to-face)  [] RE-EVAL     [x] JL(45347) x  2   [] IONTO  [x] NMR (05164) x     [] VASO  [] Manual (35016) x     [] Other:  [] TA x      [] Mech Traction (54308)  [] ES(attended) (80397)      [] ES (un) (12003):    ASSESSMENT: .  Pt improved ex tolerance following resting more at home. Educated on continuing with HEP and not to overdo activity. Pt wants to try machines NV and transition to I with HEP pending progress. GOALS:     Patient stated goal: Walking, lift weights, elliptical.     Therapist goals for Patient:   Short Term Goals: To be achieved in: 2 weeks  1. Independent in HEP and progression per patient tolerance, in order to prevent re-injury. [x] Progressing: [] Met: [] Not Met: [] Adjusted     2. Patient will have a decrease in pain to facilitate improvement in movement, function, and ADLs as indicated by Functional Deficits. [x] Progressing: [] Met: [] Not Met: [] Adjusted     Long Term Goals: To be achieved in: 12 weeks  1. Disability index score of 27% or less for the LEFS to assist with reaching prior level of function. [x] Progressing: [] Met: [] Not Met: [] Adjusted     2. Patient will demonstrate increased AROM to  100 hip flex to allow for proper joint functioning as indicated by patients Functional Deficits. [] Progressing: [x] Met: [] Not Met: [] Adjusted     3. Patient will demonstrate an increase in Strength to good proximal hip strength and control, within 5lb HHD in LE to allow for proper functional mobility as indicated by patients Functional Deficits. [] Progressing: [x] Met: [] Not Met: [] Adjusted     4. Patient will return to functional activities without increased symptoms or restriction. [x] Progressing: [] Met: [] Not Met: [] Adjusted     5. Patient will return to walking 20-30 mins I no AD without increased symptoms or restriction. (patient specific functional goal)    [x] Progressing: [] Met: [] Not Met: [] Adjusted         Overall Progression Towards Functional goals/ Treatment Progress Update:  [x] Patient is progressing as expected towards functional goals listed. [] Progression is slowed due to complexities/Impairments listed. [] Progression has been slowed due to co-morbidities.   [] Plan just implemented, too soon to assess goals progression <30days   [] Goals require adjustment due to lack of progress  [] Patient is not progressing as expected and requires additional follow up with physician  [] Other    Prognosis for POC: [x] Good [] Fair  [] Poor      Patient requires continued skilled intervention: [x] Yes  [] No    Treatment/Activity Tolerance:  [x] Patient able to complete treatment  [] Patient limited by fatigue  [] Patient limited by pain    [] Patient limited by other medical complications  [] Other:     Return to Play: (if applicable)   []  Stage 1: Intro to Strength   []  Stage 2: Return to Run and Strength   []  Stage 3: Return to Jump and Strength   []  Stage 4: Dynamic Strength and Agility   []  Stage 5: Sport Specific Training     []  Ready to Return to Play, Meets All Above Stages   []  Not Ready for Return to Sports   Comments:                          PLAN: 1-2x/week 6 weeks  [x] Continue per plan of care [] Alter current plan (see comments above)  [] Plan of care initiated [] Hold pending MD visit [] Discharge    Electronically signed by:  Anitra Morales PT    Note: If patient does not return for scheduled/ recommended follow up visits, this note will serve as a discharge from care along with most recent update on progress.

## 2021-01-19 ENCOUNTER — HOSPITAL ENCOUNTER (OUTPATIENT)
Dept: PHYSICAL THERAPY | Age: 66
Setting detail: THERAPIES SERIES
Discharge: HOME | End: 2021-01-19
Payer: MEDICARE

## 2021-01-19 PROCEDURE — 97112 NEUROMUSCULAR REEDUCATION: CPT | Performed by: PHYSICAL THERAPIST

## 2021-01-19 PROCEDURE — 97110 THERAPEUTIC EXERCISES: CPT | Performed by: PHYSICAL THERAPIST

## 2021-01-19 NOTE — FLOWSHEET NOTE
f    723 [facility], [facility] 3560 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 Richard Pascual  Has the plan of care been signed (Y/N):        [x]  Yes  []  No     Date of Patient follow up with Physician: 2020      Is this a Progress Report:     []  Yes  [x]  No        If Yes:  Date Range for reporting period:  Beginning 21  Ending     Progress report will be due (10 Rx or 30 days whichever is less): 98      Recertification will be due (POC Duration  / 90 days whichever is less): 2021       Visit # Insurance Allowable Auth Required       5   Med Nec []  Yes []  No        Functional Scale: LEFS  45%   Date assessed:   21    Latex Allergy:  [x]NO      []YES  Preferred Language for Healthcare:   [x]English       []other:      Pain level:  1-5/10     SUBJECTIVE:   Stated rested after she left LV over next day and pain reduced.    OBJECTIVE:    Observation:    Test measurements:  108 deg hip flex AROM    RESTRICTIONS/PRECAUTIONS: s/p anterior approach L AALIYAH 2020    history of skin CA, OA  Left knee needs replaced too, anxiety/depression    Exercises/Interventions:   2020 L AALIYAH  Therapeutic Ex (37753) Sets/sec Reps Notes/CUES HEP   Bike ROM  Supine TA 6 min  5\"    Not to increas symptoms  cues    Supine TA heel slide   cues    Supine ball sqz 5\"  cues    Supine clam limited ROM   Silver, cues    Supine TA  cues    Quad sets 10\"  cues with ambulation re-education including up and down stairs     Home Exercise Program:    [x] (41202) Reviewed/Progressed HEP activities related to strengthening, flexibility, endurance, ROM of core, proximal hip and LE for functional self-care, mobility, lifting and ambulation/stair navigation   [] (44652) Reviewed/Progressed HEP activities related to improving balance, coordination, kinesthetic sense, posture, motor skill, proprioception of core, proximal hip and LE for self-care, mobility, lifting, and ambulation/stair navigation      Manual Treatments:  PROM / STM / Oscillations-Mobs:  G-I, II, III, IV (PA's, Inf., Post.)  [x] (24840) Provided manual therapy to mobilize LE, proximal hip and/or LS spine soft tissue/joints for the purpose of modulating pain, promoting relaxation, increasing ROM, reducing/eliminating soft tissue swelling/inflammation/restriction, improving soft tissue extensibility and allowing for proper ROM for normal function with self-care, mobility, lifting and ambulation. Modalities:  ,   CP hip 10 min   [] GAME READY (VASO)- for significant edema, swelling, pain control. Charges:  Timed Code Treatment Minutes: 38   Total Treatment Minutes: 38      [] EVAL (LOW) 02159 (typically 20 minutes face-to-face)  [] EVAL (MOD) 99751 (typically 30 minutes face-to-face)  [] EVAL (HIGH) 49871 (typically 45 minutes face-to-face)  [] RE-EVAL     [x] VU(17534) x  2   [] IONTO  [x] NMR (06317) x     [] VASO  [] Manual (60712) x     [] Other:  [] TA x      [] Mech Traction (75672)  [] ES(attended) (38967)      [] ES (un) (54876):    ASSESSMENT: .  patrick equipment without increased pain pain. Going to go to the gym and come back with questions    GOALS:     Patient stated goal: Walking, lift weights, elliptical.     Therapist goals for Patient:   Short Term Goals: To be achieved in: 2 weeks  1. Independent in HEP and progression per patient tolerance, in order to prevent re-injury.    [x] Progressing: [] Met: [] Not Met: [] Adjusted     2. Patient will have a decrease in pain to facilitate improvement in movement, function, and ADLs as indicated by Functional Deficits. [x] Progressing: [] Met: [] Not Met: [] Adjusted     Long Term Goals: To be achieved in: 12 weeks  1. Disability index score of 27% or less for the LEFS to assist with reaching prior level of function. [x] Progressing: [] Met: [] Not Met: [] Adjusted     2. Patient will demonstrate increased AROM to  100 hip flex to allow for proper joint functioning as indicated by patients Functional Deficits. [] Progressing: [x] Met: [] Not Met: [] Adjusted     3. Patient will demonstrate an increase in Strength to good proximal hip strength and control, within 5lb HHD in LE to allow for proper functional mobility as indicated by patients Functional Deficits. [] Progressing: [x] Met: [] Not Met: [] Adjusted     4. Patient will return to functional activities without increased symptoms or restriction. [x] Progressing: [] Met: [] Not Met: [] Adjusted     5. Patient will return to walking 20-30 mins I no AD without increased symptoms or restriction. (patient specific functional goal)    [x] Progressing: [] Met: [] Not Met: [] Adjusted         Overall Progression Towards Functional goals/ Treatment Progress Update:  [x] Patient is progressing as expected towards functional goals listed. [] Progression is slowed due to complexities/Impairments listed. [] Progression has been slowed due to co-morbidities.   [] Plan just implemented, too soon to assess goals progression <30days   [] Goals require adjustment due to lack of progress  [] Patient is not progressing as expected and requires additional follow up with physician  [] Other    Prognosis for POC: [x] Good [] Fair  [] Poor      Patient requires continued skilled intervention: [x] Yes  [] No    Treatment/Activity Tolerance:  [x] Patient able to complete treatment  [] Patient limited by fatigue  [] Patient limited by pain    [] Patient limited by other medical complications  [] Other:     Return to Play: (if applicable)   []  Stage 1: Intro to Strength   []  Stage 2: Return to Run and Strength   []  Stage 3: Return to Jump and Strength   []  Stage 4: Dynamic Strength and Agility   []  Stage 5: Sport Specific Training     []  Ready to Return to Play, Meets All Above Stages   []  Not Ready for Return to Sports   Comments:                          PLAN: 1-2x/week 6 weeks  [x] Continue per plan of care [] Alter current plan (see comments above)  [] Plan of care initiated [] Hold pending MD visit [] Discharge    Electronically signed by:  Catalino Crisostomo PT    Note: If patient does not return for scheduled/ recommended follow up visits, this note will serve as a discharge from care along with most recent update on progress.

## 2021-01-28 ENCOUNTER — HOSPITAL ENCOUNTER (OUTPATIENT)
Dept: PHYSICAL THERAPY | Age: 66
Setting detail: THERAPIES SERIES
Discharge: HOME | End: 2021-01-28
Payer: MEDICARE

## 2021-01-28 PROCEDURE — 97112 NEUROMUSCULAR REEDUCATION: CPT | Performed by: PHYSICAL THERAPIST

## 2021-01-28 PROCEDURE — 97110 THERAPEUTIC EXERCISES: CPT | Performed by: PHYSICAL THERAPIST

## 2023-02-02 ENCOUNTER — NURSE TRIAGE (OUTPATIENT)
Dept: OTHER | Facility: CLINIC | Age: 68
End: 2023-02-02

## 2023-02-02 NOTE — TELEPHONE ENCOUNTER
Location of patient: [place]    Location of employment: [employer] office    Department where injury occurred: Parking [employer]    Location of injury (body part involved): right knee, right elbow, right hand    Time of injury: 2/2/2023 8:30am    Last 4 of SSN: [id number]    Subjective: Caller states \"There was a patch of ice and I slipped on it\"     Current Symptoms: right knee pain, right elbow pain, right hand pain    Knee is red, hand is \"scratched\" but denies bleeding    Pain Severity: 2/10; Temperature: denies     What has been tried: nothing    Denies - head or neck injury    Unsure of tetanus vaccine status      Recommended disposition: See in office today    Care advice provided, caller verbalizes understanding; denies any other questions or concerns. Caller was advised to be seen at [facility] and advised to complete a safecare. Reason for Disposition   No prior tetanus shots (or is not fully vaccinated) and any wound (e.g., cut or scrape)    Protocols used:  [facility] [facility]-ADULT-[place]

## 2024-03-17 ENCOUNTER — OFFICE VISIT (OUTPATIENT)
Age: 69
End: 2024-03-17

## 2024-03-17 VITALS
DIASTOLIC BLOOD PRESSURE: 88 MMHG | TEMPERATURE: 99.2 F | BODY MASS INDEX: 29.09 KG/M2 | WEIGHT: 181 LBS | HEIGHT: 66 IN | SYSTOLIC BLOOD PRESSURE: 159 MMHG | OXYGEN SATURATION: 97 % | HEART RATE: 89 BPM

## 2024-03-17 DIAGNOSIS — R05.1 ACUTE COUGH: ICD-10-CM

## 2024-03-17 DIAGNOSIS — J04.0 LARYNGITIS: Primary | ICD-10-CM

## 2024-03-17 PROBLEM — H02.30 EXCESS SKIN OF EYELID: Status: ACTIVE | Noted: 2018-04-06

## 2024-03-17 PROBLEM — D48.5 NEOPLASM OF UNCERTAIN BEHAVIOR OF SKIN: Status: ACTIVE | Noted: 2024-03-17

## 2024-03-17 PROBLEM — J02.9 PHARYNGITIS: Status: ACTIVE | Noted: 2019-11-13

## 2024-03-17 PROBLEM — M25.551 PAIN OF RIGHT HIP JOINT: Status: ACTIVE | Noted: 2023-01-09

## 2024-03-17 PROBLEM — R51.9 HEADACHE: Status: ACTIVE | Noted: 2023-11-10

## 2024-03-17 PROBLEM — M54.12 CERVICAL RADICULITIS: Status: RESOLVED | Noted: 2021-05-03 | Resolved: 2024-03-17

## 2024-03-17 PROBLEM — G43.009 MIGRAINE WITHOUT AURA AND RESPONSIVE TO TREATMENT: Status: ACTIVE | Noted: 2019-10-04

## 2024-03-17 PROBLEM — D21.9 BENIGN NEOPLASM OF SOFT TISSUE: Status: ACTIVE | Noted: 2024-03-17

## 2024-03-17 PROBLEM — R73.03 PREDIABETES: Status: ACTIVE | Noted: 2019-06-05

## 2024-03-17 PROBLEM — M16.11 OSTEOARTHRITIS OF RIGHT HIP: Status: ACTIVE | Noted: 2022-03-22

## 2024-03-17 PROBLEM — M75.41 IMPINGEMENT SYNDROME OF RIGHT SHOULDER: Status: ACTIVE | Noted: 2021-05-03

## 2024-03-17 PROBLEM — L28.0 LICHEN SIMPLEX CHRONICUS: Status: ACTIVE | Noted: 2020-03-05

## 2024-03-17 PROBLEM — J98.6 DIAPHRAGMATIC PARALYSIS: Status: ACTIVE | Noted: 2022-03-22

## 2024-03-17 PROBLEM — G89.29 CHRONIC PAIN OF RIGHT KNEE: Status: ACTIVE | Noted: 2023-12-01

## 2024-03-17 PROBLEM — R25.2 CRAMPS OF LOWER EXTREMITY: Status: ACTIVE | Noted: 2024-03-17

## 2024-03-17 PROBLEM — Z96.659 HISTORY OF TOTAL KNEE REPLACEMENT: Status: ACTIVE | Noted: 2023-08-15

## 2024-03-17 PROBLEM — R60.9 EDEMA: Status: ACTIVE | Noted: 2022-07-13

## 2024-03-17 PROBLEM — M25.561 CHRONIC PAIN OF RIGHT KNEE: Status: ACTIVE | Noted: 2023-12-01

## 2024-03-17 PROBLEM — U07.1 COVID-19: Status: ACTIVE | Noted: 2022-09-27

## 2024-03-17 PROBLEM — M16.12 OSTEOARTHRITIS OF LEFT HIP: Status: ACTIVE | Noted: 2020-11-04

## 2024-03-17 PROBLEM — G47.33 OBSTRUCTIVE SLEEP APNEA SYNDROME: Status: ACTIVE | Noted: 2022-03-22

## 2024-03-17 RX ORDER — METHYLPREDNISOLONE 4 MG/1
TABLET ORAL
Qty: 1 KIT | Refills: 0 | Status: SHIPPED | OUTPATIENT
Start: 2024-03-17 | End: 2024-03-23

## 2024-03-17 RX ORDER — DEXTROMETHORPHAN HYDROBROMIDE AND PROMETHAZINE HYDROCHLORIDE 15; 6.25 MG/5ML; MG/5ML
5 SYRUP ORAL 4 TIMES DAILY PRN
Qty: 120 ML | Refills: 0 | Status: SHIPPED | OUTPATIENT
Start: 2024-03-17 | End: 2024-03-24

## 2024-04-15 ENCOUNTER — OFFICE VISIT (OUTPATIENT)
Age: 69
End: 2024-04-15

## 2024-04-15 VITALS
SYSTOLIC BLOOD PRESSURE: 139 MMHG | BODY MASS INDEX: 27.8 KG/M2 | DIASTOLIC BLOOD PRESSURE: 70 MMHG | RESPIRATION RATE: 16 BRPM | OXYGEN SATURATION: 96 % | HEIGHT: 66 IN | TEMPERATURE: 98.1 F | HEART RATE: 98 BPM | WEIGHT: 173 LBS

## 2024-04-15 DIAGNOSIS — J01.00 ACUTE NON-RECURRENT MAXILLARY SINUSITIS: Primary | ICD-10-CM

## 2024-04-15 DIAGNOSIS — R05.1 ACUTE COUGH: ICD-10-CM

## 2024-04-15 RX ORDER — AMOXICILLIN AND CLAVULANATE POTASSIUM 875; 125 MG/1; MG/1
1 TABLET, FILM COATED ORAL 2 TIMES DAILY
Qty: 20 TABLET | Refills: 0 | Status: SHIPPED | OUTPATIENT
Start: 2024-04-15 | End: 2024-04-25

## 2024-04-15 RX ORDER — FLUCONAZOLE 150 MG/1
150 TABLET ORAL
Qty: 2 TABLET | Refills: 0 | Status: SHIPPED | OUTPATIENT
Start: 2024-04-15 | End: 2024-04-21

## 2024-04-15 RX ORDER — METHYLPREDNISOLONE 4 MG/1
TABLET ORAL
Qty: 1 KIT | Refills: 0 | Status: SHIPPED | OUTPATIENT
Start: 2024-04-15 | End: 2024-04-21

## 2024-04-15 RX ORDER — DEXTROMETHORPHAN HYDROBROMIDE AND PROMETHAZINE HYDROCHLORIDE 15; 6.25 MG/5ML; MG/5ML
5 SYRUP ORAL 4 TIMES DAILY PRN
Qty: 120 ML | Refills: 0 | Status: SHIPPED | OUTPATIENT
Start: 2024-04-15 | End: 2024-04-22

## 2024-04-15 ASSESSMENT — ENCOUNTER SYMPTOMS
SINUS PRESSURE: 1
COUGH: 1
CHEST TIGHTNESS: 1
SINUS PAIN: 1
SORE THROAT: 0
ABDOMINAL PAIN: 0
NAUSEA: 0
DIARRHEA: 0
SHORTNESS OF BREATH: 0
VOMITING: 0

## 2024-04-15 NOTE — PROGRESS NOTES
Alison Kirkland (:  1955) is a 68 y.o. female,Established patient, here for evaluation of the following chief complaint(s):  Cough (1+week, productive, pt wants to r/o pneumonia )      ASSESSMENT/PLAN:    ICD-10-CM    1. Acute cough  R05.1 promethazine-dextromethorphan (PROMETHAZINE-DM) 6.25-15 MG/5ML syrup      2. Acute non-recurrent maxillary sinusitis  J01.00 methylPREDNISolone (MEDROL DOSEPACK) 4 MG tablet          Take medication as prescribed.   Rest and Increase fluids.  We will call with Xray results.   Follow up with your PCP in the next 1 week.     SUBJECTIVE/OBJECTIVE:    History provided by:  Patient   used: No    Cough  Associated symptoms include headaches and postnasal drip. Pertinent negatives include no ear pain, fever, myalgias, rash, sore throat or shortness of breath.     HPI:   68 y.o. female presents with symptoms of cough, productive mucus, sinus pain ongoing since more than 1 week. Pt has partially collapsed lung so she has higher risk of pneumonia and wants to rule this out.  Denies fever, SOB, known sick contacts. Has taken otc for symptoms without much relief.    Vitals:    04/15/24 1414   BP: 139/70   Site: Left Upper Arm   Position: Sitting   Cuff Size: Medium Adult   Pulse: 98   Resp: 16   Temp: 98.1 °F (36.7 °C)   TempSrc: Oral   SpO2: 96%   Weight: 78.5 kg (173 lb)   Height: 1.676 m (5' 6\")       Review of Systems   Constitutional:  Negative for fatigue and fever.   HENT:  Positive for congestion, postnasal drip, sinus pressure and sinus pain. Negative for ear pain and sore throat.    Respiratory:  Positive for cough and chest tightness. Negative for shortness of breath.    Gastrointestinal:  Negative for abdominal pain, diarrhea, nausea and vomiting.   Musculoskeletal:  Negative for myalgias.   Skin:  Negative for rash.   Neurological:  Positive for headaches.       Physical Exam  Constitutional:       General: She is not in acute distress.

## 2024-04-15 NOTE — PATIENT INSTRUCTIONS
Take medication as prescribed.   Rest and Increase fluids.  We will call with Xray results.   Follow up with your PCP in the next 1 week.